# Patient Record
Sex: MALE | Race: WHITE | NOT HISPANIC OR LATINO | Employment: OTHER | URBAN - METROPOLITAN AREA
[De-identification: names, ages, dates, MRNs, and addresses within clinical notes are randomized per-mention and may not be internally consistent; named-entity substitution may affect disease eponyms.]

---

## 2023-05-30 ENCOUNTER — APPOINTMENT (OUTPATIENT)
Dept: PRIMARY CARE | Facility: CLINIC | Age: 58
End: 2023-05-30
Payer: MEDICAID

## 2023-08-04 ENCOUNTER — DOCUMENTATION (OUTPATIENT)
Dept: PRIMARY CARE | Facility: CLINIC | Age: 58
End: 2023-08-04
Payer: MEDICAID

## 2023-08-04 ENCOUNTER — PATIENT OUTREACH (OUTPATIENT)
Dept: CARDIOLOGY | Facility: CLINIC | Age: 58
End: 2023-08-04
Payer: MEDICAID

## 2023-08-04 NOTE — PROGRESS NOTES
Discharge Facility: [facility]  Discharge Diagnosis: Hyperkalemia  Admission Date: 8/1/23  Discharge Date: 8/3/23    PCP Appointment Date: none  Specialist Appointment Date: dialysis  Hospital Encounter and Summary: not available at this time (pick one)  See discharge assessment below for further details

## 2023-08-16 ENCOUNTER — PATIENT OUTREACH (OUTPATIENT)
Dept: CARDIOLOGY | Facility: CLINIC | Age: 58
End: 2023-08-16
Payer: MEDICAID

## 2023-10-13 ENCOUNTER — APPOINTMENT (OUTPATIENT)
Dept: RADIOLOGY | Facility: HOSPITAL | Age: 58
End: 2023-10-13
Payer: MEDICAID

## 2023-10-13 ENCOUNTER — HOSPITAL ENCOUNTER (OUTPATIENT)
Facility: HOSPITAL | Age: 58
Setting detail: OBSERVATION
Discharge: NURSING, REHAB OR LONG-TERM CARE | End: 2023-10-20
Attending: EMERGENCY MEDICINE | Admitting: INTERNAL MEDICINE
Payer: MEDICAID

## 2023-10-13 DIAGNOSIS — R56.9 SEIZURE (MULTI): ICD-10-CM

## 2023-10-13 DIAGNOSIS — I16.0 HYPERTENSIVE URGENCY: Primary | ICD-10-CM

## 2023-10-13 LAB
ALBUMIN SERPL BCP-MCNC: 4 G/DL (ref 3.4–5)
ALP SERPL-CCNC: 54 U/L (ref 33–120)
ALT SERPL W P-5'-P-CCNC: 16 U/L (ref 10–52)
ANION GAP SERPL CALC-SCNC: 16 MMOL/L (ref 10–20)
AST SERPL W P-5'-P-CCNC: 25 U/L (ref 9–39)
BASOPHILS # BLD AUTO: 0.04 X10*3/UL (ref 0–0.1)
BASOPHILS NFR BLD AUTO: 0.5 %
BILIRUB SERPL-MCNC: 0.4 MG/DL (ref 0–1.2)
BNP SERPL-MCNC: 1482 PG/ML (ref 0–99)
BUN SERPL-MCNC: 19 MG/DL (ref 6–23)
CALCIUM SERPL-MCNC: 8.5 MG/DL (ref 8.6–10.3)
CARDIAC TROPONIN I PNL SERPL HS: 25 NG/L (ref 0–20)
CHLORIDE SERPL-SCNC: 99 MMOL/L (ref 98–107)
CO2 SERPL-SCNC: 25 MMOL/L (ref 21–32)
CREAT SERPL-MCNC: 4.66 MG/DL (ref 0.5–1.3)
EOSINOPHIL # BLD AUTO: 0.25 X10*3/UL (ref 0–0.7)
EOSINOPHIL NFR BLD AUTO: 2.9 %
ERYTHROCYTE [DISTWIDTH] IN BLOOD BY AUTOMATED COUNT: 18.6 % (ref 11.5–14.5)
GFR SERPL CREATININE-BSD FRML MDRD: 14 ML/MIN/1.73M*2
GLUCOSE SERPL-MCNC: 126 MG/DL (ref 74–99)
HCT VFR BLD AUTO: 37.2 % (ref 41–52)
HGB BLD-MCNC: 12.2 G/DL (ref 13.5–17.5)
IMM GRANULOCYTES # BLD AUTO: 0.06 X10*3/UL (ref 0–0.7)
IMM GRANULOCYTES NFR BLD AUTO: 0.7 % (ref 0–0.9)
LYMPHOCYTES # BLD AUTO: 1.42 X10*3/UL (ref 1.2–4.8)
LYMPHOCYTES NFR BLD AUTO: 16.6 %
MAGNESIUM SERPL-MCNC: 2.04 MG/DL (ref 1.6–2.4)
MCH RBC QN AUTO: 29.8 PG (ref 26–34)
MCHC RBC AUTO-ENTMCNC: 32.8 G/DL (ref 32–36)
MCV RBC AUTO: 91 FL (ref 80–100)
MONOCYTES # BLD AUTO: 0.4 X10*3/UL (ref 0.1–1)
MONOCYTES NFR BLD AUTO: 4.7 %
NEUTROPHILS # BLD AUTO: 6.41 X10*3/UL (ref 1.2–7.7)
NEUTROPHILS NFR BLD AUTO: 74.6 %
NRBC BLD-RTO: 0 /100 WBCS (ref 0–0)
PLATELET # BLD AUTO: 102 X10*3/UL (ref 150–450)
PMV BLD AUTO: 10.8 FL (ref 7.5–11.5)
POTASSIUM SERPL-SCNC: 4.7 MMOL/L (ref 3.5–5.3)
PROT SERPL-MCNC: 7.8 G/DL (ref 6.4–8.2)
RBC # BLD AUTO: 4.1 X10*6/UL (ref 4.5–5.9)
SODIUM SERPL-SCNC: 135 MMOL/L (ref 136–145)
WBC # BLD AUTO: 8.6 X10*3/UL (ref 4.4–11.3)

## 2023-10-13 PROCEDURE — 84484 ASSAY OF TROPONIN QUANT: CPT | Performed by: NURSE PRACTITIONER

## 2023-10-13 PROCEDURE — 96376 TX/PRO/DX INJ SAME DRUG ADON: CPT

## 2023-10-13 PROCEDURE — 96375 TX/PRO/DX INJ NEW DRUG ADDON: CPT

## 2023-10-13 PROCEDURE — 72192 CT PELVIS W/O DYE: CPT | Performed by: RADIOLOGY

## 2023-10-13 PROCEDURE — 83735 ASSAY OF MAGNESIUM: CPT | Performed by: NURSE PRACTITIONER

## 2023-10-13 PROCEDURE — 80053 COMPREHEN METABOLIC PANEL: CPT | Performed by: NURSE PRACTITIONER

## 2023-10-13 PROCEDURE — 85025 COMPLETE CBC W/AUTO DIFF WBC: CPT | Performed by: NURSE PRACTITIONER

## 2023-10-13 PROCEDURE — 71046 X-RAY EXAM CHEST 2 VIEWS: CPT | Performed by: RADIOLOGY

## 2023-10-13 PROCEDURE — 83880 ASSAY OF NATRIURETIC PEPTIDE: CPT | Performed by: NURSE PRACTITIONER

## 2023-10-13 PROCEDURE — 96366 THER/PROPH/DIAG IV INF ADDON: CPT

## 2023-10-13 PROCEDURE — 36415 COLL VENOUS BLD VENIPUNCTURE: CPT | Performed by: NURSE PRACTITIONER

## 2023-10-13 PROCEDURE — 2500000004 HC RX 250 GENERAL PHARMACY W/ HCPCS (ALT 636 FOR OP/ED): Performed by: EMERGENCY MEDICINE

## 2023-10-13 PROCEDURE — 99285 EMERGENCY DEPT VISIT HI MDM: CPT | Performed by: EMERGENCY MEDICINE

## 2023-10-13 PROCEDURE — 72192 CT PELVIS W/O DYE: CPT

## 2023-10-13 PROCEDURE — 71046 X-RAY EXAM CHEST 2 VIEWS: CPT | Mod: FY

## 2023-10-13 PROCEDURE — 96374 THER/PROPH/DIAG INJ IV PUSH: CPT

## 2023-10-13 RX ORDER — HYDRALAZINE HYDROCHLORIDE 20 MG/ML
5 INJECTION INTRAMUSCULAR; INTRAVENOUS ONCE
Status: COMPLETED | OUTPATIENT
Start: 2023-10-13 | End: 2023-10-13

## 2023-10-13 RX ORDER — MORPHINE SULFATE 4 MG/ML
4 INJECTION, SOLUTION INTRAMUSCULAR; INTRAVENOUS ONCE
Status: COMPLETED | OUTPATIENT
Start: 2023-10-13 | End: 2023-10-13

## 2023-10-13 RX ADMIN — MORPHINE SULFATE 4 MG: 4 INJECTION, SOLUTION INTRAMUSCULAR; INTRAVENOUS at 23:47

## 2023-10-13 RX ADMIN — HYDRALAZINE HYDROCHLORIDE 5 MG: 20 INJECTION INTRAMUSCULAR; INTRAVENOUS at 23:48

## 2023-10-13 ASSESSMENT — COLUMBIA-SUICIDE SEVERITY RATING SCALE - C-SSRS
2. HAVE YOU ACTUALLY HAD ANY THOUGHTS OF KILLING YOURSELF?: NO
6. HAVE YOU EVER DONE ANYTHING, STARTED TO DO ANYTHING, OR PREPARED TO DO ANYTHING TO END YOUR LIFE?: NO
1. IN THE PAST MONTH, HAVE YOU WISHED YOU WERE DEAD OR WISHED YOU COULD GO TO SLEEP AND NOT WAKE UP?: NO

## 2023-10-13 NOTE — ED TRIAGE NOTES
Triage template:     This patient was seen in triage.     Vitals are noted. Hemodynamically stable. No distress.     History of present illness: Patient is a 58-year-old male with past medical history of malignant neoplasm of the testicles, type 2 diabetes, chronic kidney failure on dialysis Monday Wednesday Friday, hemiplegia affecting the right side, hemoptysis, hyperkalemia, hypertension, hyponatremia, who is still a smoker, who presents ED today from dialysis due to elevated blood pressure reading and chest pains.  Patient states they have been ongoing for a while and was recently told he has a mass in his lungs, but the pain is worse and his blood pressure is high.     Brief physical: Cardiac regular rate rhythm no murmur. Lungs clear bilaterally.   Abdomen soft, nontender.     Plan: Relevant lab work and imaging was ordered as well as an EKG.  For the remainder of the patient's workup and ED course, please see the main ED provider note.    Advised patient to the risks of leaving prior to completion of evaluation and treatment in the emergency department.

## 2023-10-14 ENCOUNTER — APPOINTMENT (OUTPATIENT)
Dept: RADIOLOGY | Facility: HOSPITAL | Age: 58
End: 2023-10-14
Payer: MEDICAID

## 2023-10-14 PROBLEM — I16.0 HYPERTENSIVE URGENCY: Status: ACTIVE | Noted: 2023-10-14

## 2023-10-14 LAB
APPEARANCE UR: CLEAR
BILIRUB UR STRIP.AUTO-MCNC: NEGATIVE MG/DL
CARDIAC TROPONIN I PNL SERPL HS: 42 NG/L (ref 0–20)
COLOR UR: ABNORMAL
D DIMER PPP FEU-MCNC: 2643 NG/ML FEU
GLUCOSE UR STRIP.AUTO-MCNC: ABNORMAL MG/DL
HOLD SPECIMEN: NORMAL
HOLD SPECIMEN: NORMAL
KETONES UR STRIP.AUTO-MCNC: NEGATIVE MG/DL
LEUKOCYTE ESTERASE UR QL STRIP.AUTO: ABNORMAL
NITRITE UR QL STRIP.AUTO: NEGATIVE
PH UR STRIP.AUTO: 9 [PH]
PROT UR STRIP.AUTO-MCNC: ABNORMAL MG/DL
RBC # UR STRIP.AUTO: NEGATIVE /UL
RBC #/AREA URNS AUTO: ABNORMAL /HPF
SP GR UR STRIP.AUTO: 1.01
UROBILINOGEN UR STRIP.AUTO-MCNC: <2 MG/DL
WBC #/AREA URNS AUTO: ABNORMAL /HPF

## 2023-10-14 PROCEDURE — 87040 BLOOD CULTURE FOR BACTERIA: CPT | Mod: CMCLAB,PARLAB | Performed by: EMERGENCY MEDICINE

## 2023-10-14 PROCEDURE — 2500000005 HC RX 250 GENERAL PHARMACY W/O HCPCS: Performed by: INTERNAL MEDICINE

## 2023-10-14 PROCEDURE — G0378 HOSPITAL OBSERVATION PER HR: HCPCS

## 2023-10-14 PROCEDURE — 96372 THER/PROPH/DIAG INJ SC/IM: CPT | Mod: 59 | Performed by: INTERNAL MEDICINE

## 2023-10-14 PROCEDURE — 2500000001 HC RX 250 WO HCPCS SELF ADMINISTERED DRUGS (ALT 637 FOR MEDICARE OP): Performed by: INTERNAL MEDICINE

## 2023-10-14 PROCEDURE — 2500000004 HC RX 250 GENERAL PHARMACY W/ HCPCS (ALT 636 FOR OP/ED): Performed by: INTERNAL MEDICINE

## 2023-10-14 PROCEDURE — 96365 THER/PROPH/DIAG IV INF INIT: CPT | Mod: 59

## 2023-10-14 PROCEDURE — 36415 COLL VENOUS BLD VENIPUNCTURE: CPT | Performed by: INTERNAL MEDICINE

## 2023-10-14 PROCEDURE — 93971 EXTREMITY STUDY: CPT | Performed by: RADIOLOGY

## 2023-10-14 PROCEDURE — 85379 FIBRIN DEGRADATION QUANT: CPT | Performed by: INTERNAL MEDICINE

## 2023-10-14 PROCEDURE — 96376 TX/PRO/DX INJ SAME DRUG ADON: CPT

## 2023-10-14 PROCEDURE — 87086 URINE CULTURE/COLONY COUNT: CPT | Mod: CMCLAB,PARLAB | Performed by: EMERGENCY MEDICINE

## 2023-10-14 PROCEDURE — 84154 ASSAY OF PSA FREE: CPT | Performed by: INTERNAL MEDICINE

## 2023-10-14 PROCEDURE — 99223 1ST HOSP IP/OBS HIGH 75: CPT | Performed by: NURSE PRACTITIONER

## 2023-10-14 PROCEDURE — 96372 THER/PROPH/DIAG INJ SC/IM: CPT | Performed by: INTERNAL MEDICINE

## 2023-10-14 PROCEDURE — 81001 URINALYSIS AUTO W/SCOPE: CPT | Performed by: EMERGENCY MEDICINE

## 2023-10-14 PROCEDURE — 36415 COLL VENOUS BLD VENIPUNCTURE: CPT | Performed by: EMERGENCY MEDICINE

## 2023-10-14 PROCEDURE — 84484 ASSAY OF TROPONIN QUANT: CPT | Performed by: INTERNAL MEDICINE

## 2023-10-14 PROCEDURE — 96375 TX/PRO/DX INJ NEW DRUG ADDON: CPT

## 2023-10-14 PROCEDURE — 93970 EXTREMITY STUDY: CPT

## 2023-10-14 PROCEDURE — 2500000004 HC RX 250 GENERAL PHARMACY W/ HCPCS (ALT 636 FOR OP/ED): Performed by: EMERGENCY MEDICINE

## 2023-10-14 RX ORDER — IPRATROPIUM BROMIDE AND ALBUTEROL SULFATE 2.5; .5 MG/3ML; MG/3ML
3 SOLUTION RESPIRATORY (INHALATION)
Status: DISCONTINUED | OUTPATIENT
Start: 2023-10-14 | End: 2023-10-14

## 2023-10-14 RX ORDER — GUAIFENESIN 600 MG/1
600 TABLET, EXTENDED RELEASE ORAL 2 TIMES DAILY
COMMUNITY

## 2023-10-14 RX ORDER — HYDRALAZINE HYDROCHLORIDE 50 MG/1
50 TABLET, FILM COATED ORAL 3 TIMES DAILY
COMMUNITY
End: 2023-10-20 | Stop reason: HOSPADM

## 2023-10-14 RX ORDER — MINOXIDIL 2.5 MG/1
5 TABLET ORAL DAILY
COMMUNITY
End: 2023-10-20 | Stop reason: HOSPADM

## 2023-10-14 RX ORDER — GABAPENTIN 100 MG/1
100 CAPSULE ORAL NIGHTLY
COMMUNITY

## 2023-10-14 RX ORDER — MORPHINE SULFATE 2 MG/ML
2 INJECTION, SOLUTION INTRAMUSCULAR; INTRAVENOUS EVERY 4 HOURS PRN
Status: DISCONTINUED | OUTPATIENT
Start: 2023-10-14 | End: 2023-10-14

## 2023-10-14 RX ORDER — PHENAZOPYRIDINE HYDROCHLORIDE 200 MG/1
200 TABLET, FILM COATED ORAL 2 TIMES DAILY
Status: DISCONTINUED | OUTPATIENT
Start: 2023-10-14 | End: 2023-10-17

## 2023-10-14 RX ORDER — MIRTAZAPINE 15 MG/1
7.5 TABLET, FILM COATED ORAL NIGHTLY
Status: DISCONTINUED | OUTPATIENT
Start: 2023-10-14 | End: 2023-10-20 | Stop reason: HOSPADM

## 2023-10-14 RX ORDER — ONDANSETRON HYDROCHLORIDE 2 MG/ML
4 INJECTION, SOLUTION INTRAVENOUS EVERY 4 HOURS PRN
Status: DISCONTINUED | OUTPATIENT
Start: 2023-10-14 | End: 2023-10-20 | Stop reason: HOSPADM

## 2023-10-14 RX ORDER — CLOPIDOGREL BISULFATE 75 MG/1
75 TABLET ORAL DAILY
Status: DISCONTINUED | OUTPATIENT
Start: 2023-10-14 | End: 2023-10-20 | Stop reason: HOSPADM

## 2023-10-14 RX ORDER — CEFTRIAXONE 1 G/50ML
1 INJECTION, SOLUTION INTRAVENOUS ONCE
Status: DISCONTINUED | OUTPATIENT
Start: 2023-10-14 | End: 2023-10-15

## 2023-10-14 RX ORDER — ATORVASTATIN CALCIUM 40 MG/1
40 TABLET, FILM COATED ORAL NIGHTLY
COMMUNITY

## 2023-10-14 RX ORDER — ATORVASTATIN CALCIUM 40 MG/1
40 TABLET, FILM COATED ORAL NIGHTLY
Status: DISCONTINUED | OUTPATIENT
Start: 2023-10-14 | End: 2023-10-20 | Stop reason: HOSPADM

## 2023-10-14 RX ORDER — AMLODIPINE BESYLATE 10 MG/1
10 TABLET ORAL DAILY
COMMUNITY

## 2023-10-14 RX ORDER — MINOXIDIL 10 MG/1
10 TABLET ORAL 2 TIMES DAILY
Status: DISCONTINUED | OUTPATIENT
Start: 2023-10-14 | End: 2023-10-20 | Stop reason: HOSPADM

## 2023-10-14 RX ORDER — GABAPENTIN 100 MG/1
100 CAPSULE ORAL NIGHTLY
Status: DISCONTINUED | OUTPATIENT
Start: 2023-10-14 | End: 2023-10-20 | Stop reason: HOSPADM

## 2023-10-14 RX ORDER — CEFTRIAXONE 1 G/50ML
1 INJECTION, SOLUTION INTRAVENOUS EVERY 24 HOURS
Status: DISCONTINUED | OUTPATIENT
Start: 2023-10-15 | End: 2023-10-15

## 2023-10-14 RX ORDER — HYDRALAZINE HYDROCHLORIDE 20 MG/ML
5 INJECTION INTRAMUSCULAR; INTRAVENOUS ONCE
Status: COMPLETED | OUTPATIENT
Start: 2023-10-14 | End: 2023-10-14

## 2023-10-14 RX ORDER — PHENAZOPYRIDINE HYDROCHLORIDE 100 MG/1
200 TABLET, FILM COATED ORAL 2 TIMES DAILY
Status: DISCONTINUED | OUTPATIENT
Start: 2023-10-14 | End: 2023-10-14 | Stop reason: SDUPTHER

## 2023-10-14 RX ORDER — CALCIUM ACETATE 667 MG/1
1334 CAPSULE ORAL 3 TIMES DAILY
COMMUNITY

## 2023-10-14 RX ORDER — ENOXAPARIN SODIUM 100 MG/ML
30 INJECTION SUBCUTANEOUS DAILY
Status: DISCONTINUED | OUTPATIENT
Start: 2023-10-14 | End: 2023-10-20 | Stop reason: HOSPADM

## 2023-10-14 RX ORDER — TRAMADOL HYDROCHLORIDE 50 MG/1
50 TABLET ORAL EVERY 6 HOURS PRN
Status: DISCONTINUED | OUTPATIENT
Start: 2023-10-14 | End: 2023-10-20 | Stop reason: HOSPADM

## 2023-10-14 RX ORDER — CALCIUM ACETATE 667 MG/1
1334 CAPSULE ORAL 3 TIMES DAILY
Status: DISCONTINUED | OUTPATIENT
Start: 2023-10-14 | End: 2023-10-20 | Stop reason: HOSPADM

## 2023-10-14 RX ORDER — HYDRALAZINE HYDROCHLORIDE 50 MG/1
50 TABLET, FILM COATED ORAL 3 TIMES DAILY
Status: DISCONTINUED | OUTPATIENT
Start: 2023-10-14 | End: 2023-10-15

## 2023-10-14 RX ORDER — GUAIFENESIN 600 MG/1
600 TABLET, EXTENDED RELEASE ORAL 2 TIMES DAILY
Status: DISCONTINUED | OUTPATIENT
Start: 2023-10-14 | End: 2023-10-20 | Stop reason: HOSPADM

## 2023-10-14 RX ORDER — MORPHINE SULFATE 4 MG/ML
4 INJECTION, SOLUTION INTRAMUSCULAR; INTRAVENOUS EVERY 4 HOURS PRN
Status: DISCONTINUED | OUTPATIENT
Start: 2023-10-14 | End: 2023-10-14

## 2023-10-14 RX ORDER — AMLODIPINE BESYLATE 10 MG/1
10 TABLET ORAL DAILY
Status: DISCONTINUED | OUTPATIENT
Start: 2023-10-14 | End: 2023-10-20 | Stop reason: HOSPADM

## 2023-10-14 RX ORDER — MORPHINE SULFATE 4 MG/ML
4 INJECTION, SOLUTION INTRAMUSCULAR; INTRAVENOUS ONCE
Status: COMPLETED | OUTPATIENT
Start: 2023-10-14 | End: 2023-10-14

## 2023-10-14 RX ORDER — CARVEDILOL 25 MG/1
25 TABLET ORAL
Status: DISCONTINUED | OUTPATIENT
Start: 2023-10-14 | End: 2023-10-20 | Stop reason: HOSPADM

## 2023-10-14 RX ORDER — IPRATROPIUM BROMIDE AND ALBUTEROL SULFATE 2.5; .5 MG/3ML; MG/3ML
3 SOLUTION RESPIRATORY (INHALATION) EVERY 2 HOUR PRN
Status: DISCONTINUED | OUTPATIENT
Start: 2023-10-14 | End: 2023-10-20 | Stop reason: HOSPADM

## 2023-10-14 RX ORDER — HYDRALAZINE HYDROCHLORIDE 50 MG/1
50 TABLET, FILM COATED ORAL 2 TIMES DAILY
Status: DISCONTINUED | OUTPATIENT
Start: 2023-10-14 | End: 2023-10-15

## 2023-10-14 RX ORDER — MIRTAZAPINE 7.5 MG/1
7.5 TABLET, FILM COATED ORAL NIGHTLY
COMMUNITY

## 2023-10-14 RX ORDER — CEFTRIAXONE 1 G/50ML
1 INJECTION, SOLUTION INTRAVENOUS ONCE
Status: COMPLETED | OUTPATIENT
Start: 2023-10-14 | End: 2023-10-14

## 2023-10-14 RX ORDER — MINOXIDIL 2.5 MG/1
5 TABLET ORAL DAILY
Status: DISCONTINUED | OUTPATIENT
Start: 2023-10-14 | End: 2023-10-15

## 2023-10-14 RX ORDER — CLOPIDOGREL BISULFATE 75 MG/1
75 TABLET ORAL DAILY
COMMUNITY

## 2023-10-14 RX ORDER — METOPROLOL TARTRATE 1 MG/ML
5 INJECTION, SOLUTION INTRAVENOUS ONCE AS NEEDED
Status: COMPLETED | OUTPATIENT
Start: 2023-10-14 | End: 2023-10-14

## 2023-10-14 RX ADMIN — HYDRALAZINE HYDROCHLORIDE 5 MG: 20 INJECTION INTRAMUSCULAR; INTRAVENOUS at 02:33

## 2023-10-14 RX ADMIN — ONDANSETRON 4 MG: 2 INJECTION INTRAMUSCULAR; INTRAVENOUS at 07:33

## 2023-10-14 RX ADMIN — GABAPENTIN 100 MG: 100 CAPSULE ORAL at 20:37

## 2023-10-14 RX ADMIN — PHENAZOPYRIDINE HYDROCHLORIDE 200 MG: 200 TABLET ORAL at 12:11

## 2023-10-14 RX ADMIN — MORPHINE SULFATE 4 MG: 4 INJECTION, SOLUTION INTRAMUSCULAR; INTRAVENOUS at 07:33

## 2023-10-14 RX ADMIN — CARVEDILOL 25 MG: 25 TABLET, FILM COATED ORAL at 16:42

## 2023-10-14 RX ADMIN — TRAMADOL HYDROCHLORIDE 50 MG: 50 TABLET, COATED ORAL at 16:42

## 2023-10-14 RX ADMIN — GUAIFENESIN 600 MG: 600 TABLET, EXTENDED RELEASE ORAL at 20:37

## 2023-10-14 RX ADMIN — AMLODIPINE BESYLATE 10 MG: 10 TABLET ORAL at 20:36

## 2023-10-14 RX ADMIN — MINOXIDIL 10 MG: 10 TABLET ORAL at 14:20

## 2023-10-14 RX ADMIN — MORPHINE SULFATE 4 MG: 4 INJECTION, SOLUTION INTRAMUSCULAR; INTRAVENOUS at 03:34

## 2023-10-14 RX ADMIN — METOPROLOL TARTRATE 5 MG: 5 INJECTION INTRAVENOUS at 07:33

## 2023-10-14 RX ADMIN — MORPHINE SULFATE 4 MG: 4 INJECTION, SOLUTION INTRAMUSCULAR; INTRAVENOUS at 12:12

## 2023-10-14 RX ADMIN — HYDRALAZINE HYDROCHLORIDE 50 MG: 50 TABLET ORAL at 20:36

## 2023-10-14 RX ADMIN — ONDANSETRON 4 MG: 2 INJECTION INTRAMUSCULAR; INTRAVENOUS at 20:56

## 2023-10-14 RX ADMIN — PHENAZOPYRIDINE HYDROCHLORIDE 200 MG: 200 TABLET ORAL at 20:37

## 2023-10-14 RX ADMIN — HYDRALAZINE HYDROCHLORIDE 50 MG: 50 TABLET ORAL at 14:20

## 2023-10-14 RX ADMIN — CALCIUM ACETATE 1334 MG: 667 CAPSULE ORAL at 20:35

## 2023-10-14 RX ADMIN — ENOXAPARIN SODIUM 30 MG: 30 INJECTION SUBCUTANEOUS at 14:20

## 2023-10-14 RX ADMIN — ATORVASTATIN CALCIUM 40 MG: 40 TABLET, FILM COATED ORAL at 20:37

## 2023-10-14 RX ADMIN — CEFTRIAXONE SODIUM 1 G: 1 INJECTION, SOLUTION INTRAVENOUS at 03:35

## 2023-10-14 RX ADMIN — MIRTAZAPINE 7.5 MG: 15 TABLET, FILM COATED ORAL at 20:37

## 2023-10-14 RX ADMIN — CLOPIDOGREL BISULFATE 75 MG: 75 TABLET ORAL at 20:36

## 2023-10-14 SDOH — SOCIAL STABILITY: SOCIAL INSECURITY: HAVE YOU HAD THOUGHTS OF HARMING ANYONE ELSE?: NO

## 2023-10-14 SDOH — SOCIAL STABILITY: SOCIAL INSECURITY: ARE YOU OR HAVE YOU BEEN THREATENED OR ABUSED PHYSICALLY, EMOTIONALLY, OR SEXUALLY BY ANYONE?: NO

## 2023-10-14 SDOH — SOCIAL STABILITY: SOCIAL INSECURITY: ARE THERE ANY APPARENT SIGNS OF INJURIES/BEHAVIORS THAT COULD BE RELATED TO ABUSE/NEGLECT?: NO

## 2023-10-14 SDOH — SOCIAL STABILITY: SOCIAL INSECURITY: DOES ANYONE TRY TO KEEP YOU FROM HAVING/CONTACTING OTHER FRIENDS OR DOING THINGS OUTSIDE YOUR HOME?: NO

## 2023-10-14 SDOH — SOCIAL STABILITY: SOCIAL INSECURITY: ABUSE: ADULT

## 2023-10-14 SDOH — SOCIAL STABILITY: SOCIAL INSECURITY: DO YOU FEEL ANYONE HAS EXPLOITED OR TAKEN ADVANTAGE OF YOU FINANCIALLY OR OF YOUR PERSONAL PROPERTY?: NO

## 2023-10-14 SDOH — SOCIAL STABILITY: SOCIAL INSECURITY: DO YOU FEEL UNSAFE GOING BACK TO THE PLACE WHERE YOU ARE LIVING?: NO

## 2023-10-14 SDOH — SOCIAL STABILITY: SOCIAL INSECURITY: HAS ANYONE EVER THREATENED TO HURT YOUR FAMILY OR YOUR PETS?: NO

## 2023-10-14 SDOH — SOCIAL STABILITY: SOCIAL INSECURITY: WERE YOU ABLE TO COMPLETE ALL THE BEHAVIORAL HEALTH SCREENINGS?: YES

## 2023-10-14 ASSESSMENT — COGNITIVE AND FUNCTIONAL STATUS - GENERAL
MOBILITY SCORE: 24
MOBILITY SCORE: 24
DAILY ACTIVITIY SCORE: 24
DAILY ACTIVITIY SCORE: 24
PATIENT BASELINE BEDBOUND: NO

## 2023-10-14 ASSESSMENT — LIFESTYLE VARIABLES
HOW OFTEN DO YOU HAVE A DRINK CONTAINING ALCOHOL: NEVER
EVER HAD A DRINK FIRST THING IN THE MORNING TO STEADY YOUR NERVES TO GET RID OF A HANGOVER: NO
EVER FELT BAD OR GUILTY ABOUT YOUR DRINKING: NO
HOW MANY STANDARD DRINKS CONTAINING ALCOHOL DO YOU HAVE ON A TYPICAL DAY: PATIENT DOES NOT DRINK
SKIP TO QUESTIONS 9-10: 1
PRESCIPTION_ABUSE_PAST_12_MONTHS: NO
HOW OFTEN DO YOU HAVE 6 OR MORE DRINKS ON ONE OCCASION: NEVER
HAVE YOU EVER FELT YOU SHOULD CUT DOWN ON YOUR DRINKING: NO
SUBSTANCE_ABUSE_PAST_12_MONTHS: NO
AUDIT-C TOTAL SCORE: 0
HAVE PEOPLE ANNOYED YOU BY CRITICIZING YOUR DRINKING: NO
REASON UNABLE TO ASSESS: YES
AUDIT-C TOTAL SCORE: 0

## 2023-10-14 ASSESSMENT — PAIN SCALES - GENERAL
PAINLEVEL_OUTOF10: 10 - WORST POSSIBLE PAIN
PAINLEVEL_OUTOF10: 7
PAINLEVEL_OUTOF10: 10 - WORST POSSIBLE PAIN
PAINLEVEL_OUTOF10: 9
PAINLEVEL_OUTOF10: 8

## 2023-10-14 ASSESSMENT — ACTIVITIES OF DAILY LIVING (ADL)
HEARING - LEFT EAR: FUNCTIONAL
TOILETING: INDEPENDENT
JUDGMENT_ADEQUATE_SAFELY_COMPLETE_DAILY_ACTIVITIES: YES
LACK_OF_TRANSPORTATION: NO
WALKS IN HOME: INDEPENDENT
BATHING: INDEPENDENT
HEARING - RIGHT EAR: FUNCTIONAL
GROOMING: INDEPENDENT
DRESSING YOURSELF: INDEPENDENT
FEEDING YOURSELF: INDEPENDENT
ADEQUATE_TO_COMPLETE_ADL: YES
ASSISTIVE_DEVICE: WALKER
PATIENT'S MEMORY ADEQUATE TO SAFELY COMPLETE DAILY ACTIVITIES?: YES

## 2023-10-14 ASSESSMENT — COLUMBIA-SUICIDE SEVERITY RATING SCALE - C-SSRS
1. IN THE PAST MONTH, HAVE YOU WISHED YOU WERE DEAD OR WISHED YOU COULD GO TO SLEEP AND NOT WAKE UP?: NO
2. HAVE YOU ACTUALLY HAD ANY THOUGHTS OF KILLING YOURSELF?: NO
5. HAVE YOU STARTED TO WORK OUT OR WORKED OUT THE DETAILS OF HOW TO KILL YOURSELF? DO YOU INTEND TO CARRY OUT THIS PLAN?: NO
6. HAVE YOU EVER DONE ANYTHING, STARTED TO DO ANYTHING, OR PREPARED TO DO ANYTHING TO END YOUR LIFE?: NO
4. HAVE YOU HAD THESE THOUGHTS AND HAD SOME INTENTION OF ACTING ON THEM?: NO

## 2023-10-14 ASSESSMENT — PAIN - FUNCTIONAL ASSESSMENT
PAIN_FUNCTIONAL_ASSESSMENT: 0-10
PAIN_FUNCTIONAL_ASSESSMENT: 0-10

## 2023-10-14 ASSESSMENT — PATIENT HEALTH QUESTIONNAIRE - PHQ9
1. LITTLE INTEREST OR PLEASURE IN DOING THINGS: SEVERAL DAYS
SUM OF ALL RESPONSES TO PHQ9 QUESTIONS 1 & 2: 2
2. FEELING DOWN, DEPRESSED OR HOPELESS: SEVERAL DAYS

## 2023-10-14 NOTE — CONSULTS
Reason For Consult  End-stage renal disease/hypertension    History Of Present Illness  Ponce Do is a 58 y.o. male presenting with atypical chest pain elevated blood pressure with a systolic above 200  He dialyzes 3 times a week last dialysis was Friday.  He also refers some abdominal discomfort.  CT scan of the abdomen and pelvis showed a thickened urinary bladder with edema of the bladder is also well suggestive of acute cystitis.  You will have simple renal cysts bilaterally in both kidneys he refers he was recently admitted to [facility] ([place], [place]) for pneumonia  He states she presented with hemoptysis.  He was discharged to [facility].    His past history significant for end-stage renal disease secondary to renovascular hypertension.  He also have history of cancer of the testicle s/p orchiectomy and cancer of the colon.  Patient has a history of congenital heart disease he had a echocardiogram done in January 2023 which showed arctic stenosis mild to moderate.  If compression was normal he also have history of CVA.  In 2021 involving the right cerebellar area.  He also have history of diabetes type 2, hyperlipidemia, and thrombocytopenia.  Follow-up patient have a duplex of the leg veins done today which was unremarkable, and negative for DVT.  Patient states that for his blood pressure he is on 4-5 different medications last recorded medicines include #1 carvedilol No. 2 Cardura No. 3 hydralazine No. 4 amlodipine No. 5 minoxidil  He denies double vision he denies palpitation he also states that he only urinates around 1 cup of liquid daily.  Urinalysis at the medical emergency room show elevation of the pH 9 suddenly show a WBC between 11-20 per high-power field red blood cells 3.5 per high-power field.     Past Medical History  #1 end-stage renal disease  #2 cancer of the testicle  #3 colon cancer  #4 renovascular hypertension  #5 anemia of renal disease  #6 recent history of pneumonia  #7  type 2 diabetes  #8 coronary artery disease  #9 mild arctic stenosis  #10 cerebellar cerebrovascular accident  #11 spinal stenosis #12 chronic low back pain  Surgical History  He has a past surgical history that includes MR angio head wo IV contrast (6/16/2021) and MR angio neck wo IV contrast (6/16/2021).  Orchiectomy       Social History  He reports that he has been smoking cigarettes. He has a 15.00 pack-year smoking history. He has never used smokeless tobacco. He reports that he does not currently use alcohol. He reports that he does not use drugs.    Family History  #1 coronary artery disease  #2 hypertension #3 diabetes     Allergies  Patient has no known allergies.    Review of Systems  HEENT; blurry vision  Lungs; positive for hemoptysis; positive for shortness of breath and dyspnea on exertion  Heart; positive for palpitations chest pain, edema  GI; positive for nausea emesis   positive for dysuria hematuria end-stage renal disease  Musculoskeletal; history of weakness  Neurological; positive for blurry vision dizziness vertigo and equilibrium loss       Physical Exam  General appearance; asthenic habitus, alert oriented  HEENT; no nystagmus, no ptosis, ocular movements are intact              Pupils react to light and accommodation.  Neck ;there is no bruit or thyromegaly or lymphadenopathy.  No jugular venous distention  There is a catheter for dialysis right jugular no induration no exudation no tenderness  Lungs; there is inspiratory crackles left base and there are scattered expiratory wheezing  Heart; no palpitations there is a 1-2/6 a subtle border systolic murmur no rubs no thrills no gallops  Abdomen; active peristalsis no rebound or guarding   no CVA tenderness; there is mild suprapubic tenderness  Extremities there is no edema decreased muscle tone in the muscles of  proximal leg muscles there is no tenderness  Skin there is no bruising no ecchymosis or petechiae  Neurological; no clonus no  "tremors no asterixis       I&O 24HR  No intake or output data in the 24 hours ending 10/14/23 1304    Vitals 24HR  Heart Rate:  [71-84]   Temp:  [36.8 °C (98.2 °F)-37.1 °C (98.8 °F)]   Resp:  [16-18]   BP: (172-217)/()   Height:  [170.2 cm (5' 7\")]   Weight:  [58.1 kg (128 lb)]   SpO2:  [96 %-100 %]            Assessment/Plan   #1 renovascular hypertension  #2 end-stage renal disease  #3 cystitis  #4 history of cancer of the testicles  #5 thrombocytopenia  Plan we will adjust BP medications  He will have dialysis on Monday  We will add a D-dimer/uric acid/lipid panel  Renal panel  Continue antibiotics awaiting cultures of the urine and blood cultures        Principal Problem:    Hypertensive urgency      I spent 60 minutes in the professional and overall care of this patient.      Aftab Pelletier MD    "

## 2023-10-14 NOTE — ED PROVIDER NOTES
HPI   Chief Complaint   Patient presents with    Hypertension       58-year-old male with history of ESRD on dialysis, testicular cancer, hypertension presents to the ED for 5 days of lower groin and leg pain.  He was referred from dialysis for elevated blood pressure.  He denies any chest pain or shortness of breath.  No headaches.  Denies any trauma.  Reports getting a full dialysis session today.  No fevers.                          No data recorded                Patient History   History reviewed. No pertinent past medical history.  Past Surgical History:   Procedure Laterality Date    MR HEAD ANGIO WO IV CONTRAST  6/16/2021    MR HEAD ANGIO WO IV CONTRAST 6/16/2021 [facility]    MR NECK ANGIO WO IV CONTRAST  6/16/2021    MR NECK ANGIO WO IV CONTRAST 6/16/2021 [facility]     No family history on file.  Social History     Tobacco Use    Smoking status: Not on file    Smokeless tobacco: Not on file   Substance Use Topics    Alcohol use: Not on file    Drug use: Not on file       Physical Exam   ED Triage Vitals   Temp Heart Rate Resp BP   10/13/23 1736 10/13/23 1736 10/13/23 1736 10/13/23 1737   37.1 °C (98.8 °F) 80 18 (!) 172/94      SpO2 Temp src Heart Rate Source Patient Position   10/13/23 1736 -- -- 10/13/23 1736   96 %   Sitting      BP Location FiO2 (%)     10/13/23 1736 --     Right arm        Physical Exam    ED Course & MDM        Medical Decision Making      Procedure  Procedures

## 2023-10-14 NOTE — H&P
History Of Present Illness  Ponce Do is a 58 y.o. male presenting to emergency department from dialysis for evaluation of hypertension.  Patient was reportedly at dialysis today and remained hypertensive throughout his session.  Patient states he was able to complete his full dialysis session.  Patient was sent into emergency department for evaluation of hypertension.  Patient does report an increase in leg pain.  Patient denies chest pain or dizziness.  Patient denies recent illness, nausea, vomiting, diarrhea.    In ED, chest x-ray completed and negative for acute process per radiology review.  Bilateral lower extremity ultrasound completed and negative for acute process.  CT of the abdomen pelvis completed showing bladder wall thickening and testicular abnormalities concerning for malignancy consistent with patient's history of testicular cancer.  Hemoglobin 12.2, hematocrit 37.2, BNP 1482, troponin 25, glucose 126, sodium 135, GFR 14, creatinine 4.66.  Blood cultures and urine culture pending.  UTI positive.  Patient started on IV ceftriaxone.  Patient medicated with multiple doses of hydralazine.  Blood pressure 182/87, heart rate 82, respirations 18, temperature 36.8 °C, SPO2 98% on room air.  Patient admitted to telemetry observation under the care of Dr. Gomez who will continue to follow.  I was asked to do H&P and place initial admission orders.     Past Medical History  ESRD with hemodialysis [facility], testicular cancer, cerebral infarct with hemiplegia (right-sided), hyperkalemia, hypertension, hyponatremia, diabetes, recent pneumonia with recurrent hemoptysis    Surgical History  Bronchoscopy     Social History  He has no history on file for tobacco use, alcohol use, and drug use.    Family History  Reviewed and noncontributory     Allergies  NKDA/NKA    Review of Systems  A 10 point review of systems was completed and negative except what is listed in HPI  Physical  "Exam  Constitutional:       Appearance: Normal appearance.   HENT:      Mouth/Throat:      Mouth: Mucous membranes are moist.      Pharynx: Oropharynx is clear.   Eyes:      Pupils: Pupils are equal, round, and reactive to light.   Cardiovascular:      Rate and Rhythm: Normal rate and regular rhythm.   Pulmonary:      Effort: Pulmonary effort is normal.      Breath sounds: Normal breath sounds.   Abdominal:      General: Bowel sounds are normal.      Palpations: Abdomen is soft.   Musculoskeletal:         General: Normal range of motion.      Cervical back: Normal range of motion.   Skin:     General: Skin is warm and dry.      Capillary Refill: Capillary refill takes less than 2 seconds.   Neurological:      General: No focal deficit present.      Mental Status: He is alert and oriented to person, place, and time. Mental status is at baseline.   Psychiatric:         Mood and Affect: Mood normal.         Behavior: Behavior normal.          Last Recorded Vitals  Blood pressure (!) 182/87, pulse 82, temperature 37.1 °C (98.8 °F), resp. rate 18, height 1.702 m (5' 7\"), weight 58.1 kg (128 lb), SpO2 98 %.    Relevant Results  Vascular US lower extremity venous duplex bilateral    Result Date: 10/14/2023  Interpreted By:  Ponce Nunes, STUDY: [facility] US LOWER EXTREMITY VENOUS DUPLEX BILATERAL; 10/14/2023 1:25 am   INDICATION: Signs/Symptoms:pain.   COMPARISON: None.   ACCESSION NUMBER(S): [id number]   ORDERING CLINICIAN: MARC CROFT   TECHNIQUE: 2D grayscale and color-flow duplex images were obtained along with Doppler spectral waveform analysis of the deep venous system of the lower extremity from the groin to the knee. Attempts were made to image the calf veins. Venous compression and augmentation were performed.   FINDINGS: Right Lower Extremity: There is normal compressibility and flow within the visualized vessels of the deep venous system of this lower extremity.   Left Lower Extremity: There is normal " compressibility and flow within the visualized vessels of the deep venous system of this lower extremity.         No evidence for deep venous thrombosis within the limits of this examination.   MACRO: None.   Signed by: Ponce Nunes 10/14/2023 1:43 AM Dictation workstation:   [id number]    CT pelvis wo IV contrast    Result Date: 10/14/2023  Interpreted By:  Kavon Piedra, STUDY: CT PELVIS WO IV CONTRAST;  10/13/2023 11:50 pm   INDICATION: Signs/Symptoms:left groin pain. hx of testicular cancer.   COMPARISON: 8/1/2023   ACCESSION NUMBER(S): [id number]   ORDERING CLINICIAN: MARC CROFT   TECHNIQUE: Contiguous axial images of the pelvis were obtained without intravenous contrast. Coronal and sagittal reformatted images were obtained from the axial images.   FINDINGS: Limited evaluation the soft tissues secondary to lack of intravenous. Gallstones noted in the gallbladder. No evidence of bowel obstruction.   Urinary bladder is underdistended and not well evaluated. There is however prominent urinary bladder wall thickening and edema.   Limited evaluation of the scrotum.   No evidence acute displaced pelvic fracture. Vascular calcifications.       Urinary bladder wall thickening and edema compatible with cystitis; please clinically correlate with urinalysis.   No evidence of inguinal hernia.   Limited evaluation the scrotum and if there is concern for scrotal or testicular pathology, scrotal ultrasound is recommended for further evaluation.   MACRO: None   Signed by: Kavon Piedra 10/14/2023 12:41 AM Dictation workstation:   [id number]    XR chest 2 views    Result Date: 10/13/2023  Interpreted By:  Flynn Messer, STUDY: XR CHEST 2 VIEWS;  10/13/2023 5:58 pm   INDICATION: Signs/Symptoms:CP.   COMPARISON: Chest radiograph 08/13/2023   ACCESSION NUMBER(S): [id number]   ORDERING CLINICIAN: ROBIN MCKINLEY   FINDINGS: SUPPORT DEVICES: Right IJ central venous catheter terminates at the cavoatrial junction. Metallic  device overlies the right heart, unchanged from prior exams, possibly reflecting a interatrial septal occlusion device.   CARDIOMEDIASTINAL SILHOUETTE: Cardiomediastinal silhouette is normal in size and configuration.   LUNGS: No pulmonary consolidation, pleural effusion or pneumothorax.   ABDOMEN: No remarkable upper abdominal findings.   BONES: No acute osseous abnormality.       1. No acute cardiopulmonary process.     Signed by: Flynn Messer 10/13/2023 6:33 PM Dictation workstation:   [id number]      Results for orders placed or performed during the hospital encounter of 10/13/23 (from the past 24 hour(s))   Troponin I, High Sensitivity   Result Value Ref Range    Troponin I, High Sensitivity 25 (H) 0 - 20 ng/L   Magnesium   Result Value Ref Range    Magnesium 2.04 1.60 - 2.40 mg/dL   Comprehensive Metabolic Panel   Result Value Ref Range    Glucose 126 (H) 74 - 99 mg/dL    Sodium 135 (L) 136 - 145 mmol/L    Potassium 4.7 3.5 - 5.3 mmol/L    Chloride 99 98 - 107 mmol/L    Bicarbonate 25 21 - 32 mmol/L    Anion Gap 16 10 - 20 mmol/L    Urea Nitrogen 19 6 - 23 mg/dL    Creatinine 4.66 (H) 0.50 - 1.30 mg/dL    eGFR 14 (L) >60 mL/min/1.73m*2    Calcium 8.5 (L) 8.6 - 10.3 mg/dL    Albumin 4.0 3.4 - 5.0 g/dL    Alkaline Phosphatase 54 33 - 120 U/L    Total Protein 7.8 6.4 - 8.2 g/dL    AST 25 9 - 39 U/L    Bilirubin, Total 0.4 0.0 - 1.2 mg/dL    ALT 16 10 - 52 U/L   CBC and Auto Differential   Result Value Ref Range    WBC 8.6 4.4 - 11.3 x10*3/uL    nRBC 0.0 0.0 - 0.0 /100 WBCs    RBC 4.10 (L) 4.50 - 5.90 x10*6/uL    Hemoglobin 12.2 (L) 13.5 - 17.5 g/dL    Hematocrit 37.2 (L) 41.0 - 52.0 %    MCV 91 80 - 100 fL    MCH 29.8 26.0 - 34.0 pg    MCHC 32.8 32.0 - 36.0 g/dL    RDW 18.6 (H) 11.5 - 14.5 %    Platelets 102 (L) 150 - 450 x10*3/uL    MPV 10.8 7.5 - 11.5 fL    Neutrophils % 74.6 40.0 - 80.0 %    Immature Granulocytes %, Automated 0.7 0.0 - 0.9 %    Lymphocytes % 16.6 13.0 - 44.0 %    Monocytes % 4.7 2.0 - 10.0  %    Eosinophils % 2.9 0.0 - 6.0 %    Basophils % 0.5 0.0 - 2.0 %    Neutrophils Absolute 6.41 1.20 - 7.70 x10*3/uL    Immature Granulocytes Absolute, Automated 0.06 0.00 - 0.70 x10*3/uL    Lymphocytes Absolute 1.42 1.20 - 4.80 x10*3/uL    Monocytes Absolute 0.40 0.10 - 1.00 x10*3/uL    Eosinophils Absolute 0.25 0.00 - 0.70 x10*3/uL    Basophils Absolute 0.04 0.00 - 0.10 x10*3/uL   B-type natriuretic peptide   Result Value Ref Range    BNP 1,482 (H) 0 - 99 pg/mL   Urinalysis with Reflex Microscopic and Culture   Result Value Ref Range    Color, Urine Straw Straw, Yellow    Appearance, Urine Clear Clear    Specific Gravity, Urine 1.008 1.005 - 1.035    pH, Urine 9.0 (N) 5.0, 5.5, 6.0, 6.5, 7.0, 7.5, 8.0    Protein, Urine >=500 (3+) (N) NEGATIVE mg/dL    Glucose, Urine 150 (2+) (A) NEGATIVE mg/dL    Blood, Urine NEGATIVE NEGATIVE    Ketones, Urine NEGATIVE NEGATIVE mg/dL    Bilirubin, Urine NEGATIVE NEGATIVE    Urobilinogen, Urine <2.0 <2.0 mg/dL    Nitrite, Urine NEGATIVE NEGATIVE    Leukocyte Esterase, Urine TRACE (A) NEGATIVE   Extra Urine Gray Tube   Result Value Ref Range    Extra Tube Hold for add-ons.    Urinalysis Microscopic Only   Result Value Ref Range    WBC, Urine 11-20 (A) 1-5, NONE /HPF    RBC, Urine 3-5 NONE, 1-2, 3-5 /HPF         Assessment/Plan   Ponce is a 58-year-old male patient presenting to emergency department from hemodialysis today with complaint of elevated blood pressure.  Patient states he remained hypertensive throughout his whole dialysis session but was able to complete the session.  Patient medicated with multiple doses of hydralazine in ED, current blood pressure 182/87.  BNP 1482, troponin 25.  Labs consistent with ESRD.  Patient found to have a UTI and started on IV ceftriaxone.  Imaging showing bladder wall thickening but negative for acute process per radiology review.  Patient medicated for pain and admitted for further medical management.  Hypertensive urgency  Admit to telemetry  observation per Dr. Gomez  See imaging results above  Hydralazine IV x2 in ED  Metoprolol IV x1 as needed  Telemetry monitoring  Resume home medications when med rec is complete  Morphine as needed/Zofran as needed  Trend troponin    2.  ESRD/HTN/hyponatremia/hyper kalemia/diabetes/testicular cancer   Renal diet  Continue home medications when med rec is complete    3.  DVT Ppx  SCDs  No chemical prophylaxis 2/2 recurrent hemoptysis  Activity as tolerated    I spent 20 minutes in the professional and overall care of this patient.      SHINE Peacock-CNP  Patient fully evaluated and plan as above.

## 2023-10-14 NOTE — PROGRESS NOTES
10/14/23 1541   Discharge Planning   Living Arrangements   (hotels, homeless)   Support Systems Family members   Assistance Needed food, shelter   Type of Residence Homeless   Patient expects to be discharged to: SNF+hemodialysis     I met with patient at his bedside, verified insurance.  Insurance listed is Medicaid but patient reported he has Medicare also, has paperwork in his vehicle.  Patient is homeless, had been staying in hotels with his adult son who passed away from overdose Last Night.  Per patient, he has had 4 strokes with R sided weakness and 2 cancers.  He ambulates with a walker, drives and does have difficulty caring for himself.  His son was available to assist as needed prior to yesterday.  Patient attends hemodialysis treatments at [facility] 3x/week.  Patient reported that without his son, he will be unable to care for himself and would like to go to a facility with onsite dialysis until he is able to secure [facility] housing which is in the process but will take months to finalize.  Referrals to be sent to facilities with onsite hemodialysis that accept his insurance.  Care Coordination team to continue to follow for assistance with discharge planning.  Kay HOFF TCC

## 2023-10-15 ENCOUNTER — APPOINTMENT (OUTPATIENT)
Dept: RADIOLOGY | Facility: HOSPITAL | Age: 58
End: 2023-10-15
Payer: MEDICAID

## 2023-10-15 LAB
ALBUMIN SERPL BCP-MCNC: 3.4 G/DL (ref 3.4–5)
ALBUMIN SERPL BCP-MCNC: 3.8 G/DL (ref 3.4–5)
ALP SERPL-CCNC: 57 U/L (ref 33–120)
ALT SERPL W P-5'-P-CCNC: 14 U/L (ref 10–52)
ANION GAP BLDA CALCULATED.4IONS-SCNC: 19 MMO/L (ref 10–25)
ANION GAP SERPL CALC-SCNC: 18 MMOL/L (ref 10–20)
ANION GAP SERPL CALC-SCNC: 19 MMOL/L (ref 10–20)
ANION GAP SERPL CALC-SCNC: 26 MMOL/L (ref 10–20)
APPARATUS: ABNORMAL
APTT PPP: 33 SECONDS (ref 27–38)
AST SERPL W P-5'-P-CCNC: 16 U/L (ref 9–39)
BACTERIA UR CULT: NORMAL
BASE EXCESS BLDA CALC-SCNC: -4.4 MMOL/L (ref -2–3)
BILIRUB SERPL-MCNC: 0.4 MG/DL (ref 0–1.2)
BODY TEMPERATURE: 37 DEGREES CELSIUS
BUN SERPL-MCNC: 48 MG/DL (ref 6–23)
BUN SERPL-MCNC: 50 MG/DL (ref 6–23)
BUN SERPL-MCNC: 51 MG/DL (ref 6–23)
CA-I BLDA-SCNC: 1.11 MMOL/L (ref 1.1–1.33)
CALCIUM SERPL-MCNC: 7.8 MG/DL (ref 8.6–10.3)
CALCIUM SERPL-MCNC: 8.1 MG/DL (ref 8.6–10.3)
CALCIUM SERPL-MCNC: 8.2 MG/DL (ref 8.6–10.3)
CHLORIDE BLDA-SCNC: 95 MMOL/L (ref 98–107)
CHLORIDE SERPL-SCNC: 94 MMOL/L (ref 98–107)
CHLORIDE SERPL-SCNC: 95 MMOL/L (ref 98–107)
CHLORIDE SERPL-SCNC: 95 MMOL/L (ref 98–107)
CO2 SERPL-SCNC: 21 MMOL/L (ref 21–32)
CO2 SERPL-SCNC: 28 MMOL/L (ref 21–32)
CO2 SERPL-SCNC: 28 MMOL/L (ref 21–32)
CREAT SERPL-MCNC: 8.18 MG/DL (ref 0.5–1.3)
CREAT SERPL-MCNC: 8.54 MG/DL (ref 0.5–1.3)
CREAT SERPL-MCNC: 8.65 MG/DL (ref 0.5–1.3)
CRITICAL CALL TIME: 1525
CRITICAL CALLED BY: ABNORMAL
CRITICAL CALLED TO: ABNORMAL
CRITICAL READ BACK: ABNORMAL
ERYTHROCYTE [DISTWIDTH] IN BLOOD BY AUTOMATED COUNT: 18.4 % (ref 11.5–14.5)
ERYTHROCYTE [DISTWIDTH] IN BLOOD BY AUTOMATED COUNT: 18.7 % (ref 11.5–14.5)
GFR SERPL CREATININE-BSD FRML MDRD: 7 ML/MIN/1.73M*2
GLUCOSE BLD MANUAL STRIP-MCNC: 122 MG/DL (ref 74–99)
GLUCOSE BLDA-MCNC: 121 MG/DL (ref 74–99)
GLUCOSE SERPL-MCNC: 111 MG/DL (ref 74–99)
GLUCOSE SERPL-MCNC: 76 MG/DL (ref 74–99)
GLUCOSE SERPL-MCNC: 76 MG/DL (ref 74–99)
HCO3 BLDA-SCNC: 22.6 MMOL/L (ref 22–26)
HCT VFR BLD AUTO: 34.9 % (ref 41–52)
HCT VFR BLD AUTO: 39.2 % (ref 41–52)
HCT VFR BLD EST: 38 % (ref 41–52)
HGB BLD-MCNC: 11.4 G/DL (ref 13.5–17.5)
HGB BLD-MCNC: 12.6 G/DL (ref 13.5–17.5)
HGB BLDA-MCNC: 12.8 G/DL (ref 13.5–17.5)
HOLD SPECIMEN: NORMAL
HOLD SPECIMEN: NORMAL
INHALED O2 CONCENTRATION: 28 %
INR PPP: 1 (ref 0.9–1.1)
LACTATE BLDA-SCNC: 7.6 MMOL/L (ref 0.4–2)
LACTATE SERPL-SCNC: 1.1 MMOL/L (ref 0.4–2)
LACTATE SERPL-SCNC: 6.6 MMOL/L (ref 0.4–2)
MCH RBC QN AUTO: 29.5 PG (ref 26–34)
MCH RBC QN AUTO: 29.6 PG (ref 26–34)
MCHC RBC AUTO-ENTMCNC: 32.1 G/DL (ref 32–36)
MCHC RBC AUTO-ENTMCNC: 32.7 G/DL (ref 32–36)
MCV RBC AUTO: 90 FL (ref 80–100)
MCV RBC AUTO: 92 FL (ref 80–100)
NRBC BLD-RTO: 0 /100 WBCS (ref 0–0)
NRBC BLD-RTO: 0 /100 WBCS (ref 0–0)
OXYHGB MFR BLDA: 92.5 % (ref 94–98)
PCO2 BLDA: 48 MM HG (ref 38–42)
PH BLDA: 7.28 PH (ref 7.38–7.42)
PHOSPHATE SERPL-MCNC: 6.3 MG/DL (ref 2.5–4.9)
PLATELET # BLD AUTO: 103 X10*3/UL (ref 150–450)
PLATELET # BLD AUTO: 118 X10*3/UL (ref 150–450)
PMV BLD AUTO: 10.2 FL (ref 7.5–11.5)
PMV BLD AUTO: 9.5 FL (ref 7.5–11.5)
PO2 BLDA: 82 MM HG (ref 85–95)
POTASSIUM BLDA-SCNC: 4.8 MMOL/L (ref 3.5–5.3)
POTASSIUM SERPL-SCNC: 4.6 MMOL/L (ref 3.5–5.3)
POTASSIUM SERPL-SCNC: 5 MMOL/L (ref 3.5–5.3)
POTASSIUM SERPL-SCNC: 5.1 MMOL/L (ref 3.5–5.3)
PROT SERPL-MCNC: 7.5 G/DL (ref 6.4–8.2)
PROTHROMBIN TIME: 11.5 SECONDS (ref 9.8–12.8)
RBC # BLD AUTO: 3.87 X10*6/UL (ref 4.5–5.9)
RBC # BLD AUTO: 4.25 X10*6/UL (ref 4.5–5.9)
SAO2 % BLDA: 96 % (ref 94–100)
SODIUM BLDA-SCNC: 132 MMOL/L (ref 136–145)
SODIUM SERPL-SCNC: 136 MMOL/L (ref 136–145)
SODIUM SERPL-SCNC: 136 MMOL/L (ref 136–145)
SODIUM SERPL-SCNC: 137 MMOL/L (ref 136–145)
URATE SERPL-MCNC: 4.9 MG/DL (ref 4–7.5)
WBC # BLD AUTO: 12.8 X10*3/UL (ref 4.4–11.3)
WBC # BLD AUTO: 9.5 X10*3/UL (ref 4.4–11.3)

## 2023-10-15 PROCEDURE — 70450 CT HEAD/BRAIN W/O DYE: CPT | Performed by: RADIOLOGY

## 2023-10-15 PROCEDURE — 84550 ASSAY OF BLOOD/URIC ACID: CPT | Performed by: INTERNAL MEDICINE

## 2023-10-15 PROCEDURE — 85610 PROTHROMBIN TIME: CPT

## 2023-10-15 PROCEDURE — 84146 ASSAY OF PROLACTIN: CPT | Mod: CMCLAB,PARLAB | Performed by: INTERNAL MEDICINE

## 2023-10-15 PROCEDURE — 2500000001 HC RX 250 WO HCPCS SELF ADMINISTERED DRUGS (ALT 637 FOR MEDICARE OP): Performed by: INTERNAL MEDICINE

## 2023-10-15 PROCEDURE — 36600 WITHDRAWAL OF ARTERIAL BLOOD: CPT

## 2023-10-15 PROCEDURE — 80053 COMPREHEN METABOLIC PANEL: CPT | Performed by: INTERNAL MEDICINE

## 2023-10-15 PROCEDURE — 99233 SBSQ HOSP IP/OBS HIGH 50: CPT | Performed by: INTERNAL MEDICINE

## 2023-10-15 PROCEDURE — 2500000004 HC RX 250 GENERAL PHARMACY W/ HCPCS (ALT 636 FOR OP/ED): Performed by: INTERNAL MEDICINE

## 2023-10-15 PROCEDURE — 96376 TX/PRO/DX INJ SAME DRUG ADON: CPT

## 2023-10-15 PROCEDURE — 36415 COLL VENOUS BLD VENIPUNCTURE: CPT | Performed by: INTERNAL MEDICINE

## 2023-10-15 PROCEDURE — 2500000004 HC RX 250 GENERAL PHARMACY W/ HCPCS (ALT 636 FOR OP/ED)

## 2023-10-15 PROCEDURE — G0378 HOSPITAL OBSERVATION PER HR: HCPCS

## 2023-10-15 PROCEDURE — 36415 COLL VENOUS BLD VENIPUNCTURE: CPT

## 2023-10-15 PROCEDURE — 82947 ASSAY GLUCOSE BLOOD QUANT: CPT | Mod: 91

## 2023-10-15 PROCEDURE — 96375 TX/PRO/DX INJ NEW DRUG ADDON: CPT

## 2023-10-15 PROCEDURE — 71275 CT ANGIOGRAPHY CHEST: CPT | Performed by: STUDENT IN AN ORGANIZED HEALTH CARE EDUCATION/TRAINING PROGRAM

## 2023-10-15 PROCEDURE — 99233 SBSQ HOSP IP/OBS HIGH 50: CPT

## 2023-10-15 PROCEDURE — 71275 CT ANGIOGRAPHY CHEST: CPT

## 2023-10-15 PROCEDURE — 76870 US EXAM SCROTUM: CPT | Performed by: RADIOLOGY

## 2023-10-15 PROCEDURE — 70450 CT HEAD/BRAIN W/O DYE: CPT | Mod: 59

## 2023-10-15 PROCEDURE — 85027 COMPLETE CBC AUTOMATED: CPT

## 2023-10-15 PROCEDURE — 80048 BASIC METABOLIC PNL TOTAL CA: CPT | Mod: CCI

## 2023-10-15 PROCEDURE — 85027 COMPLETE CBC AUTOMATED: CPT | Performed by: INTERNAL MEDICINE

## 2023-10-15 PROCEDURE — 84132 ASSAY OF SERUM POTASSIUM: CPT

## 2023-10-15 PROCEDURE — 70450 CT HEAD/BRAIN W/O DYE: CPT

## 2023-10-15 PROCEDURE — 83605 ASSAY OF LACTIC ACID: CPT

## 2023-10-15 PROCEDURE — 96366 THER/PROPH/DIAG IV INF ADDON: CPT

## 2023-10-15 RX ORDER — HYDRALAZINE HYDROCHLORIDE 20 MG/ML
10 INJECTION INTRAMUSCULAR; INTRAVENOUS ONCE
Status: COMPLETED | OUTPATIENT
Start: 2023-10-15 | End: 2023-10-15

## 2023-10-15 RX ORDER — LEVETIRACETAM 10 MG/ML
1000 INJECTION INTRAVASCULAR ONCE
Status: COMPLETED | OUTPATIENT
Start: 2023-10-15 | End: 2023-10-15

## 2023-10-15 RX ORDER — ENOXAPARIN SODIUM 100 MG/ML
30 INJECTION SUBCUTANEOUS DAILY
Status: CANCELLED | OUTPATIENT
Start: 2023-10-15

## 2023-10-15 RX ORDER — HYDROXYZINE HYDROCHLORIDE 25 MG/1
25 TABLET, FILM COATED ORAL EVERY 6 HOURS PRN
Status: DISCONTINUED | OUTPATIENT
Start: 2023-10-15 | End: 2023-10-20 | Stop reason: HOSPADM

## 2023-10-15 RX ORDER — LORAZEPAM 2 MG/ML
1 INJECTION INTRAMUSCULAR ONCE
Status: COMPLETED | OUTPATIENT
Start: 2023-10-15 | End: 2023-10-15

## 2023-10-15 RX ORDER — HEPARIN SODIUM 1000 [USP'U]/ML
2000 INJECTION, SOLUTION INTRAVENOUS; SUBCUTANEOUS
Status: DISCONTINUED | OUTPATIENT
Start: 2023-10-16 | End: 2023-10-20 | Stop reason: HOSPADM

## 2023-10-15 RX ORDER — CLOPIDOGREL BISULFATE 75 MG/1
75 TABLET ORAL DAILY
Status: CANCELLED | OUTPATIENT
Start: 2023-10-15

## 2023-10-15 RX ORDER — HYDRALAZINE HYDROCHLORIDE 50 MG/1
100 TABLET, FILM COATED ORAL 2 TIMES DAILY
Status: DISCONTINUED | OUTPATIENT
Start: 2023-10-15 | End: 2023-10-20 | Stop reason: HOSPADM

## 2023-10-15 RX ORDER — LEVETIRACETAM 500 MG/1
500 TABLET ORAL DAILY
Status: DISCONTINUED | OUTPATIENT
Start: 2023-10-16 | End: 2023-10-15

## 2023-10-15 RX ORDER — HYDRALAZINE HYDROCHLORIDE 20 MG/ML
10 INJECTION INTRAMUSCULAR; INTRAVENOUS EVERY 6 HOURS PRN
Status: DISCONTINUED | OUTPATIENT
Start: 2023-10-15 | End: 2023-10-20 | Stop reason: HOSPADM

## 2023-10-15 RX ORDER — SODIUM CHLORIDE 9 MG/ML
100 INJECTION, SOLUTION INTRAVENOUS CONTINUOUS
Status: ACTIVE | OUTPATIENT
Start: 2023-10-15 | End: 2023-10-16

## 2023-10-15 RX ORDER — LEVETIRACETAM 500 MG/1
500 TABLET ORAL 2 TIMES DAILY
Status: DISCONTINUED | OUTPATIENT
Start: 2023-10-15 | End: 2023-10-16

## 2023-10-15 RX ADMIN — LEVETIRACETAM 1000 MG: 10 INJECTION INTRAVENOUS at 16:52

## 2023-10-15 RX ADMIN — GABAPENTIN 100 MG: 100 CAPSULE ORAL at 21:14

## 2023-10-15 RX ADMIN — CALCIUM ACETATE 1334 MG: 667 CAPSULE ORAL at 16:13

## 2023-10-15 RX ADMIN — MINOXIDIL 10 MG: 10 TABLET ORAL at 09:25

## 2023-10-15 RX ADMIN — TRAMADOL HYDROCHLORIDE 50 MG: 50 TABLET, COATED ORAL at 12:12

## 2023-10-15 RX ADMIN — HYDRALAZINE HYDROCHLORIDE 10 MG: 20 INJECTION INTRAMUSCULAR; INTRAVENOUS at 08:00

## 2023-10-15 RX ADMIN — GUAIFENESIN 600 MG: 600 TABLET, EXTENDED RELEASE ORAL at 21:15

## 2023-10-15 RX ADMIN — MINOXIDIL 10 MG: 10 TABLET ORAL at 21:12

## 2023-10-15 RX ADMIN — TRAMADOL HYDROCHLORIDE 50 MG: 50 TABLET, COATED ORAL at 00:16

## 2023-10-15 RX ADMIN — CARVEDILOL 25 MG: 25 TABLET, FILM COATED ORAL at 09:24

## 2023-10-15 RX ADMIN — CALCIUM ACETATE 1334 MG: 667 CAPSULE ORAL at 09:24

## 2023-10-15 RX ADMIN — MIRTAZAPINE 7.5 MG: 15 TABLET, FILM COATED ORAL at 21:12

## 2023-10-15 RX ADMIN — GUAIFENESIN 600 MG: 600 TABLET, EXTENDED RELEASE ORAL at 09:25

## 2023-10-15 RX ADMIN — AMLODIPINE BESYLATE 10 MG: 10 TABLET ORAL at 09:24

## 2023-10-15 RX ADMIN — PHENAZOPYRIDINE HYDROCHLORIDE 200 MG: 200 TABLET ORAL at 09:25

## 2023-10-15 RX ADMIN — HYDRALAZINE HYDROCHLORIDE 50 MG: 50 TABLET ORAL at 09:24

## 2023-10-15 RX ADMIN — CARVEDILOL 25 MG: 25 TABLET, FILM COATED ORAL at 16:13

## 2023-10-15 RX ADMIN — LORAZEPAM 1 MG: 2 INJECTION INTRAMUSCULAR; INTRAVENOUS at 16:13

## 2023-10-15 RX ADMIN — ONDANSETRON 4 MG: 2 INJECTION INTRAMUSCULAR; INTRAVENOUS at 12:12

## 2023-10-15 RX ADMIN — PHENAZOPYRIDINE HYDROCHLORIDE 200 MG: 200 TABLET ORAL at 21:11

## 2023-10-15 RX ADMIN — CALCIUM ACETATE 1334 MG: 667 CAPSULE ORAL at 21:13

## 2023-10-15 RX ADMIN — TRAMADOL HYDROCHLORIDE 50 MG: 50 TABLET, COATED ORAL at 06:13

## 2023-10-15 RX ADMIN — ATORVASTATIN CALCIUM 40 MG: 40 TABLET, FILM COATED ORAL at 21:13

## 2023-10-15 RX ADMIN — SODIUM CHLORIDE 100 ML/HR: 9 INJECTION, SOLUTION INTRAVENOUS at 16:02

## 2023-10-15 RX ADMIN — TRAMADOL HYDROCHLORIDE 50 MG: 50 TABLET, COATED ORAL at 21:20

## 2023-10-15 RX ADMIN — CEFTRIAXONE SODIUM 1 G: 1 INJECTION, SOLUTION INTRAVENOUS at 06:14

## 2023-10-15 RX ADMIN — HYDRALAZINE HYDROCHLORIDE 100 MG: 50 TABLET ORAL at 21:15

## 2023-10-15 ASSESSMENT — COGNITIVE AND FUNCTIONAL STATUS - GENERAL
WALKING IN HOSPITAL ROOM: A LITTLE
DAILY ACTIVITIY SCORE: 22
MOBILITY SCORE: 19
STANDING UP FROM CHAIR USING ARMS: A LITTLE
CLIMB 3 TO 5 STEPS WITH RAILING: A LOT
HELP NEEDED FOR BATHING: A LITTLE
MOVING TO AND FROM BED TO CHAIR: A LITTLE
DRESSING REGULAR LOWER BODY CLOTHING: A LITTLE

## 2023-10-15 ASSESSMENT — ENCOUNTER SYMPTOMS
CHILLS: 0
DIFFICULTY URINATING: 0
FEVER: 0

## 2023-10-15 ASSESSMENT — PAIN SCALES - GENERAL
PAINLEVEL_OUTOF10: 10 - WORST POSSIBLE PAIN
PAINLEVEL_OUTOF10: 6
PAINLEVEL_OUTOF10: 7
PAINLEVEL_OUTOF10: 0 - NO PAIN
PAINLEVEL_OUTOF10: 8

## 2023-10-15 ASSESSMENT — PAIN - FUNCTIONAL ASSESSMENT
PAIN_FUNCTIONAL_ASSESSMENT: 0-10
PAIN_FUNCTIONAL_ASSESSMENT: 0-10

## 2023-10-15 NOTE — CODE DOCUMENTATION
Rapid response  was called at 07:50 for patient who was found on the floor in a pool of blood with a laceration/ hematoma on left frontal scalp area due to unwitnessed fall while using the bathroom, pt is currently on blood thinners. Upon arrival pt was hypertensive . Patient oriented & Alert, no distress noted.     BP on arrival : /116 HR 84   EKG showed NSR  Patient Given 10 mg of hydralazine Per Dr. Hudson verbal order  BP post 10 mg of hydralazine : BP  213/106     ICU Nurse dismissed , no further intervention needed.  Patient taken to CT scan.

## 2023-10-15 NOTE — PROGRESS NOTES
Ponce Do is a 58 y.o. male on day 0 of admission presenting with Hypertensive urgency.      Subjective     Patient fell today with some laceration on the scalp frontal area he cannot recall if he lost consciousness.  Patient is receiving Plavix and Lovenox.  They have elevation of the D-dimer and CT of chest  angio is pending to rule out PE,  Patient will be dialyze in the morning  Patient blood pressure continue to be elevated dose of medications to be adjusted       Objective          Vitals 24 HR  Heart Rate:  [67-78]   Temp:  [36.3 °C (97.3 °F)-36.9 °C (98.4 °F)]   Resp:  [16-20]   BP: (175-201)/()   SpO2:  [93 %-97 %]         Intake/Output last 3 Shifts:  No intake or output data in the 24 hours ending 10/15/23 1229    Physical Exam    Relevant Results  Here to assess if you would like to pull medications.med's would like to link you are delivered a healthy  No current facility-administered medications on file prior to encounter.     Current Outpatient Medications on File Prior to Encounter   Medication Sig Dispense Refill    amLODIPine (Norvasc) 10 mg tablet Take 1 tablet (10 mg) by mouth once daily.      atorvastatin (Lipitor) 40 mg tablet Take 1 tablet (40 mg) by mouth once daily at bedtime. Take at bedtime      calcium acetate (Phoslo) 667 mg capsule Take 2 capsules (1,334 mg) by mouth 3 times a day.      clopidogrel (Plavix) 75 mg tablet Take 1 tablet (75 mg) by mouth once daily.      gabapentin (Neurontin) 100 mg capsule Take 1 capsule (100 mg) by mouth once daily at bedtime.      guaiFENesin (Mucinex) 600 mg 12 hr tablet Take 1 tablet (600 mg) by mouth 2 times a day. Do not crush, chew, or split.      hydrALAZINE (Apresoline) 50 mg tablet Take 1 tablet (50 mg) by mouth 3 times a day.      minoxidil (Loniten) 2.5 mg tablet Take 2 tablets (5 mg) by mouth once daily.      mirtazapine (Remeron) 7.5 mg tablet Take 1 tablet (7.5 mg) by mouth once daily at bedtime.          Results for orders placed  or performed during the hospital encounter of 10/13/23 (from the past 24 hour(s))   D-dimer, Non VTE   Result Value Ref Range    D-Dimer Non VTE, Quant (ng/mL FEU) 2,643 (H) <=500 ng/mL FEU   SST TOP   Result Value Ref Range    Extra Tube Hold for add-ons.    Renal function panel   Result Value Ref Range    Glucose 76 74 - 99 mg/dL    Sodium 136 136 - 145 mmol/L    Potassium 5.1 3.5 - 5.3 mmol/L    Chloride 95 (L) 98 - 107 mmol/L    Bicarbonate 28 21 - 32 mmol/L    Anion Gap 18 10 - 20 mmol/L    Urea Nitrogen 50 (H) 6 - 23 mg/dL    Creatinine 8.65 (H) 0.50 - 1.30 mg/dL    eGFR 7 (L) >60 mL/min/1.73m*2    Calcium 7.8 (L) 8.6 - 10.3 mg/dL    Phosphorus 6.3 (H) 2.5 - 4.9 mg/dL    Albumin 3.4 3.4 - 5.0 g/dL   CBC   Result Value Ref Range    WBC 9.5 4.4 - 11.3 x10*3/uL    nRBC 0.0 0.0 - 0.0 /100 WBCs    RBC 3.87 (L) 4.50 - 5.90 x10*6/uL    Hemoglobin 11.4 (L) 13.5 - 17.5 g/dL    Hematocrit 34.9 (L) 41.0 - 52.0 %    MCV 90 80 - 100 fL    MCH 29.5 26.0 - 34.0 pg    MCHC 32.7 32.0 - 36.0 g/dL    RDW 18.4 (H) 11.5 - 14.5 %    Platelets 103 (L) 150 - 450 x10*3/uL    MPV 10.2 7.5 - 11.5 fL   Uric Acid   Result Value Ref Range    Uric Acid 4.9 4.0 - 7.5 mg/dL   Basic metabolic panel   Result Value Ref Range    Glucose 76 74 - 99 mg/dL    Sodium 137 136 - 145 mmol/L    Potassium 5.0 3.5 - 5.3 mmol/L    Chloride 95 (L) 98 - 107 mmol/L    Bicarbonate 28 21 - 32 mmol/L    Anion Gap 19 10 - 20 mmol/L    Urea Nitrogen 48 (H) 6 - 23 mg/dL    Creatinine 8.18 (H) 0.50 - 1.30 mg/dL    eGFR 7 (L) >60 mL/min/1.73m*2    Calcium 8.1 (L) 8.6 - 10.3 mg/dL   Coagulation Screen   Result Value Ref Range    Protime 11.5 9.8 - 12.8 seconds    INR 1.0 0.9 - 1.1    aPTT 33 27 - 38 seconds   Green Top   Result Value Ref Range    Extra Tube Hold for add-ons.    Lavender Top   Result Value Ref Range    Extra Tube Hold for add-ons.     O;  GA; alert oriented  HEENT; extraocular movements are intact there is no nystagmus no ptosis  Pupils react to light  and accommodation  Neck; no bruit, no jugular venous distention, no adenopathy  Lungs; clear to auscultation and percussion  Heart; 1-2/6 no sternal or systolic murmur no gallop no rub or thrill  Abdomen; active peristalsis no rebound or guarding but shift and dullness  Extremities; no edema  Neurologic:; No tremors, no clonus, no asterixis     Assessment/Plan   Hypertension severe  End-stage renal disease  Thrombocytopenia  Elevation of the D-dimer suggesting thrombophilia  Cystitis  History of testicular cancer        Plan adjust antihypertensives  Dialysis in the morning                Aftab Pelletier MD

## 2023-10-15 NOTE — PROGRESS NOTES
"CLINICAL EVENT NOTE - RAPID RESPONSE - Seizure    Ponce Do is a 58 y.o. male is a 57yo man with past medical history of CVA, ESRD on HD, HTN who is currently being treated by hypertensive urgency. Earlier today patient had a mechanical fall with head trauma (See Rapid Response note). CTH showed no acute intracranial pathology and showed scalp hematoma.  Following this event, patient was noted by staff to have transient left sided facial numbness.    Rapid response was called at 15:06. Patient was being transferred to radiology for a CTA due to concerns for possible PE in the setting of elevated d-dimer. Patient was noted by staff to be displaying seizure-like activity - rhythmically moving all 4 extremities and was described as \"thrashing\" in his bed. When response team arrived, patient was obtunded, with no response to noxious stimuli. Vitals were stable. POCT glucose showed no hypoglycemia. During response team, patient became more alert and did not display seizure activity before returning to initial level of arousal. CTH was done which showed no intracranial hemorrhage. ABG was done which showed acidosis and elevated lactate to 7.6. Patient was protecting his airway. Primary team was notified of event and patient was transferred to step-down unit/8th floor.    Objective:  GEN: Obtunded  HEENT: Left-sided scalp laceration bandaged without significant bleeding  CV: S1, S2, regular, no murmur  PULM: CTAB  ABD: soft, ND  EXT: no LE edema  NEURO: AOx0, non contracted, no rhythmic movements      Assessment and Plan:  #New onset seizure - possible withdrawal etiology  #Lactic acidosis  #AMS in setting of above  - Will give 1mg ativan and 1000mg Keppra now, continue with 500mg BID keppra  - Neurology consulted  - Will get EEG  - Labs: CBC, CMP, Lactate  - Will check Utox, consider PRN ativan  - Continue Q1 neurochecks  - NS 100ml/hr for 1L    This is a preliminary note, please await attending attestation for a " finalized plan.    Larry Delarosa MD  Internal Medicine PGY2  Please DocHalo me with any questions

## 2023-10-15 NOTE — PROGRESS NOTES
Rapid response was called at 07:48 to evaluate the patient who was found on the floor with a pool of blood.  Patient had an unwitnessed fall while using the bathroom.  He denies losing consciousness and says he tripped.  He was found to be hypertensive and tachycardic.  Briefly this is a 58-year-old male who presented for hypertension and had a dialysis session yesterday with persistent hypotension.  He is on blood thinners.  Patient was not given his morning antihypertensive medications yet.  Upon evaluation, patient was given 10 mg hydralazine.    Initial vitals:   /121  HR 79  SPO2 94%    Objective:  GENERAL:  Alert, oriented, and in no distress  HEAD:  Normocephalic, atraumatic  EYES:  Extraocular muscles intact  ENT: Lesion with blood on the left side of scalp  CARDIOVASCULAR:  RRR, no murmurs or gallops, intact peripheral pulses  RESPIRATORY:  Appropriate respiratory effort, no wheezing crackles or tachypnea  ABDOMEN:  Soft and nontender without organomegaly or peritoneal signs  EXTREMITIES:  Pulses intact, full strength and ROM  NEUROLOGICAL:  CN grossly intact, arousable and appropriately interactive    Assessment & Plan:  #Mechanical fall  #Hypertensive urgency  #Head laceration  Patient was given 10 mg hydralazine  Stat head CT  Hold blood thinners  Q1h neurochecks and vitals

## 2023-10-15 NOTE — CARE PLAN
The patient's goals for the shift include comfort and rest.    The clinical goals for the shift include safety and fall prevention

## 2023-10-15 NOTE — CONSULTS
Reason For Consult  Hx of Testicular CA    History Of Present Illness  Ponce Do is a 58 y.o. male presenting with hypertension at dialysis. Hx of testicular cancer, pt states had his L testicle removed when he was in his 20s. No issues since, no testicular pain, difficulties voiding. Has been having L groin/hip pain for the past 2 weeks. States constant 10/10 pain, same amount of pain with movement but somewhat better with rest. Pain radiates to his low back. No fever/chills. Pt appears restless on consultation.      Past Medical History  He has a past medical history of Cancer (CMS/HCC).    Surgical History  He has a past surgical history that includes MR angio head wo IV contrast (6/16/2021) and MR angio neck wo IV contrast (6/16/2021).     Social History  He reports that he has been smoking cigarettes. He has a 15.00 pack-year smoking history. He has never used smokeless tobacco. He reports that he does not currently use alcohol. He reports that he does not use drugs.    Family History  No family history on file.     Allergies  Patient has no known allergies.    Review of Systems   Constitutional:  Negative for chills and fever.   Genitourinary:  Negative for difficulty urinating, penile pain, penile swelling, scrotal swelling and testicular pain.         Physical Exam  Restless  R testicle without any masses or infx on exam  Penis normal appearing  L groin/hip/perineum without infx rosanna necrosis or crepitus       Current Facility-Administered Medications:     amLODIPine (Norvasc) tablet 10 mg, 10 mg, oral, Daily, Enrique Gomez MD, 10 mg at 10/14/23 2036    atorvastatin (Lipitor) tablet 40 mg, 40 mg, oral, Nightly, Enrique Gomez MD, 40 mg at 10/14/23 2037    calcium acetate (Phoslo) capsule 1,334 mg, 1,334 mg, oral, TID, Enrique Gomez MD, 1,334 mg at 10/14/23 2035    carvedilol (Coreg) tablet 25 mg, 25 mg, oral, BID with meals, Aftab Pelletier MD, 25 mg at 10/14/23 1642    cefTRIAXone  "(Rocephin) IVPB 1 g, 1 g, intravenous, q24h, Enrique Gomez MD, Stopped at 10/15/23 0644    cefTRIAXone (Rocephin) IVPB 1 g, 1 g, intravenous, Once, Enrique Gomez MD    clopidogrel (Plavix) tablet 75 mg, 75 mg, oral, Daily, Enrique Gomez MD, 75 mg at 10/14/23 2036    enoxaparin (Lovenox) syringe 30 mg, 30 mg, subcutaneous, Daily, Aftab Pelletier MD, 30 mg at 10/14/23 1420    gabapentin (Neurontin) capsule 100 mg, 100 mg, oral, Nightly, Enrique Gomez MD, 100 mg at 10/14/23 2037    guaiFENesin (Mucinex) 12 hr tablet 600 mg, 600 mg, oral, BID, Enrique Gomez MD, 600 mg at 10/14/23 2037    hydrALAZINE (Apresoline) injection 10 mg, 10 mg, intravenous, q6h PRN, Lydia Tobias, SHINE-CNP    hydrALAZINE (Apresoline) tablet 50 mg, 50 mg, oral, TID, Enrique Gomez MD, 50 mg at 10/14/23 2036    ipratropium-albuteroL (Duo-Neb) 0.5-2.5 mg/3 mL nebulizer solution 3 mL, 3 mL, nebulization, q2h PRN, Enrique Gomez MD    minoxidil (Loniten) tablet 10 mg, 10 mg, oral, BID, Aftab Pelletier MD, 10 mg at 10/14/23 1420    minoxidil (Loniten) tablet 5 mg, 5 mg, oral, Daily, Enrique Gomez MD    mirtazapine (Remeron) tablet 7.5 mg, 7.5 mg, oral, Nightly, Enrique Gomez MD, 7.5 mg at 10/14/23 2037    ondansetron (Zofran) injection 4 mg, 4 mg, intravenous, q4h PRN, Enrique Gomez MD, 4 mg at 10/14/23 2056    phenazopyridine (Pyridium) tablet 200 mg, 200 mg, oral, BID, Enrique Gomez MD, 200 mg at 10/14/23 2037    traMADol (Ultram) tablet 50 mg, 50 mg, oral, q6h PRN, Enrique Gomez MD, 50 mg at 10/15/23 0613     Last Recorded Vitals  Blood pressure (!) 175/99, pulse 74, temperature 36.6 °C (97.9 °F), resp. rate 20, height 1.702 m (5' 7\"), weight 58.1 kg (128 lb), SpO2 93 %.    Relevant Results  Results for orders placed or performed during the hospital encounter of 10/13/23 (from the past 96 hour(s))   Troponin I, High Sensitivity   Result Value Ref Range    Troponin I, High Sensitivity 25 (H) 0 - 20 " ng/L   Magnesium   Result Value Ref Range    Magnesium 2.04 1.60 - 2.40 mg/dL   Comprehensive Metabolic Panel   Result Value Ref Range    Glucose 126 (H) 74 - 99 mg/dL    Sodium 135 (L) 136 - 145 mmol/L    Potassium 4.7 3.5 - 5.3 mmol/L    Chloride 99 98 - 107 mmol/L    Bicarbonate 25 21 - 32 mmol/L    Anion Gap 16 10 - 20 mmol/L    Urea Nitrogen 19 6 - 23 mg/dL    Creatinine 4.66 (H) 0.50 - 1.30 mg/dL    eGFR 14 (L) >60 mL/min/1.73m*2    Calcium 8.5 (L) 8.6 - 10.3 mg/dL    Albumin 4.0 3.4 - 5.0 g/dL    Alkaline Phosphatase 54 33 - 120 U/L    Total Protein 7.8 6.4 - 8.2 g/dL    AST 25 9 - 39 U/L    Bilirubin, Total 0.4 0.0 - 1.2 mg/dL    ALT 16 10 - 52 U/L   CBC and Auto Differential   Result Value Ref Range    WBC 8.6 4.4 - 11.3 x10*3/uL    nRBC 0.0 0.0 - 0.0 /100 WBCs    RBC 4.10 (L) 4.50 - 5.90 x10*6/uL    Hemoglobin 12.2 (L) 13.5 - 17.5 g/dL    Hematocrit 37.2 (L) 41.0 - 52.0 %    MCV 91 80 - 100 fL    MCH 29.8 26.0 - 34.0 pg    MCHC 32.8 32.0 - 36.0 g/dL    RDW 18.6 (H) 11.5 - 14.5 %    Platelets 102 (L) 150 - 450 x10*3/uL    MPV 10.8 7.5 - 11.5 fL    Neutrophils % 74.6 40.0 - 80.0 %    Immature Granulocytes %, Automated 0.7 0.0 - 0.9 %    Lymphocytes % 16.6 13.0 - 44.0 %    Monocytes % 4.7 2.0 - 10.0 %    Eosinophils % 2.9 0.0 - 6.0 %    Basophils % 0.5 0.0 - 2.0 %    Neutrophils Absolute 6.41 1.20 - 7.70 x10*3/uL    Immature Granulocytes Absolute, Automated 0.06 0.00 - 0.70 x10*3/uL    Lymphocytes Absolute 1.42 1.20 - 4.80 x10*3/uL    Monocytes Absolute 0.40 0.10 - 1.00 x10*3/uL    Eosinophils Absolute 0.25 0.00 - 0.70 x10*3/uL    Basophils Absolute 0.04 0.00 - 0.10 x10*3/uL   B-type natriuretic peptide   Result Value Ref Range    BNP 1,482 (H) 0 - 99 pg/mL   Urinalysis with Reflex Microscopic and Culture   Result Value Ref Range    Color, Urine Straw Straw, Yellow    Appearance, Urine Clear Clear    Specific Gravity, Urine 1.008 1.005 - 1.035    pH, Urine 9.0 (N) 5.0, 5.5, 6.0, 6.5, 7.0, 7.5, 8.0    Protein,  Urine >=500 (3+) (N) NEGATIVE mg/dL    Glucose, Urine 150 (2+) (A) NEGATIVE mg/dL    Blood, Urine NEGATIVE NEGATIVE    Ketones, Urine NEGATIVE NEGATIVE mg/dL    Bilirubin, Urine NEGATIVE NEGATIVE    Urobilinogen, Urine <2.0 <2.0 mg/dL    Nitrite, Urine NEGATIVE NEGATIVE    Leukocyte Esterase, Urine TRACE (A) NEGATIVE   Extra Urine Gray Tube   Result Value Ref Range    Extra Tube Hold for add-ons.    Urinalysis Microscopic Only   Result Value Ref Range    WBC, Urine 11-20 (A) 1-5, NONE /HPF    RBC, Urine 3-5 NONE, 1-2, 3-5 /HPF   Urine Culture    Specimen: Straight Catheter; Urine   Result Value Ref Range    Urine Culture No significant growth    Blood Culture    Specimen: Peripheral Venipuncture; Blood culture   Result Value Ref Range    Blood Culture Loaded on Instrument - Culture in progress    Blood Culture    Specimen: Peripheral Venipuncture; Blood culture   Result Value Ref Range    Blood Culture Loaded on Instrument - Culture in progress    Troponin I, High Sensitivity   Result Value Ref Range    Troponin I, High Sensitivity 42 (H) 0 - 20 ng/L   D-dimer, Non VTE   Result Value Ref Range    D-Dimer Non VTE, Quant (ng/mL FEU) 2,643 (H) <=500 ng/mL FEU   SST TOP   Result Value Ref Range    Extra Tube Hold for add-ons.       Vascular US lower extremity venous duplex bilateral    Result Date: 10/14/2023  Interpreted By:  Ponce Nunes, STUDY: [facility] US LOWER EXTREMITY VENOUS DUPLEX BILATERAL; 10/14/2023 1:25 am   INDICATION: Signs/Symptoms:pain.   COMPARISON: None.   ACCESSION NUMBER(S): [id number]   ORDERING CLINICIAN: MARC CROFT   TECHNIQUE: 2D grayscale and color-flow duplex images were obtained along with Doppler spectral waveform analysis of the deep venous system of the lower extremity from the groin to the knee. Attempts were made to image the calf veins. Venous compression and augmentation were performed.   FINDINGS: Right Lower Extremity: There is normal compressibility and flow within the visualized  vessels of the deep venous system of this lower extremity.   Left Lower Extremity: There is normal compressibility and flow within the visualized vessels of the deep venous system of this lower extremity.         No evidence for deep venous thrombosis within the limits of this examination.   MACRO: None.   Signed by: Ponce Nunes 10/14/2023 1:43 AM Dictation workstation:   [id number]    CT pelvis wo IV contrast    Result Date: 10/14/2023  Interpreted By:  Kavon Piedra, STUDY: CT PELVIS WO IV CONTRAST;  10/13/2023 11:50 pm   INDICATION: Signs/Symptoms:left groin pain. hx of testicular cancer.   COMPARISON: 8/1/2023   ACCESSION NUMBER(S): [id number]   ORDERING CLINICIAN: MARC CROFT   TECHNIQUE: Contiguous axial images of the pelvis were obtained without intravenous contrast. Coronal and sagittal reformatted images were obtained from the axial images.   FINDINGS: Limited evaluation the soft tissues secondary to lack of intravenous. Gallstones noted in the gallbladder. No evidence of bowel obstruction.   Urinary bladder is underdistended and not well evaluated. There is however prominent urinary bladder wall thickening and edema.   Limited evaluation of the scrotum.   No evidence acute displaced pelvic fracture. Vascular calcifications.       Urinary bladder wall thickening and edema compatible with cystitis; please clinically correlate with urinalysis.   No evidence of inguinal hernia.   Limited evaluation the scrotum and if there is concern for scrotal or testicular pathology, scrotal ultrasound is recommended for further evaluation.   MACRO: None   Signed by: Kavon Piedra 10/14/2023 12:41 AM Dictation workstation:   [id number]    XR chest 2 views    Result Date: 10/13/2023  Interpreted By:  Flynn Messer, STUDY: XR CHEST 2 VIEWS;  10/13/2023 5:58 pm   INDICATION: Signs/Symptoms:CP.   COMPARISON: Chest radiograph 08/13/2023   ACCESSION NUMBER(S): [id number]   ORDERING CLINICIAN: ROBIN MCKINLEY   FINDINGS:  SUPPORT DEVICES: Right IJ central venous catheter terminates at the cavoatrial junction. Metallic device overlies the right heart, unchanged from prior exams, possibly reflecting a interatrial septal occlusion device.   CARDIOMEDIASTINAL SILHOUETTE: Cardiomediastinal silhouette is normal in size and configuration.   LUNGS: No pulmonary consolidation, pleural effusion or pneumothorax.   ABDOMEN: No remarkable upper abdominal findings.   BONES: No acute osseous abnormality.       1. No acute cardiopulmonary process.     Signed by: Flynn Messer 10/13/2023 6:33 PM Dictation workstation:   [id number]        Assessment/Plan   Hx of testicular CA/L groin/hip pain  -Exam normal  -Recommend Scrotal US  -Pain likely a lumbar radiculopathy   Hypertensive urgency/ESRD  -Per Primary Team/Nephrology    Thank you for allowing us to participate in this patient's care, we will follow.    Mihir Dobbins PA-C

## 2023-10-15 NOTE — PROGRESS NOTES
Ponce Do is a 58 y.o. male on day 0 of admission presenting with Hypertensive urgency.      Subjective   Patient with fall earlier.  Clinically unchanged.       Objective     Last Recorded Vitals  BP (!) 199/95 (BP Location: Left arm, Patient Position: Lying)   Pulse 78   Temp 36.6 °C (97.9 °F)   Resp 14   Wt 58.1 kg (128 lb)   SpO2 94%   Intake/Output last 3 Shifts:  No intake or output data in the 24 hours ending 10/15/23 1443    Admission Weight  Weight: 58.1 kg (128 lb) (10/13/23 1736)    Daily Weight  10/13/23 : 58.1 kg (128 lb)    Image Results  CT head wo IV contrast  Narrative: Interpreted By:  Annamarie Phan,   STUDY:  CT HEAD WO IV CONTRAST;  10/15/2023 8:12 am      INDICATION:  Signs/Symptoms:fall.      COMPARISON:  07/11/2023      ACCESSION NUMBER(S):  [id number]      ORDERING CLINICIAN:  ROBBY RAY      TECHNIQUE:  Examination was performed in the axial plane with sagittal and  coronal reconstructions. Bone and soft tissue algorithms were  performed.      FINDINGS:  INTRACRANIAL:  The ventricular system is symmetrical and nondilated.  No mass or mass effect is identified.  There is no hemorrhage or subdural fluid collection.  There is no acute infarct.  There is a fairly large remote right cerebellar hemispheric infarct.  There is a small left frontal scalp hematoma. There is no underlying  fracture of the calvarium.      EXTRACRANIAL:  Visualized paranasal sinuses and mastoids are clear.      Impression: 1. No acute intracranial pathology.  2. Left frontal scalp hematoma.  3. Remote right cerebellar hemispheric infarct.      MACRO:  None      Signed by: Annamarie Phan 10/15/2023 8:22 AM  Dictation workstation:   [id number]      Physical Exam    Relevant Results               Assessment/Plan   This patient currently has cardiac telemetry ordered; if you would like to modify or discontinue the telemetry order, click here to go to the orders activity to modify/discontinue the  order.            Principal Problem:    Hypertensive urgency            Enrique Gomez MD  Physician  Internal Medicine     H&P      Addendum     Date of Service: 10/14/2023  5:59 AM     Addendum       Expand All Collapse All    History Of Present Illness  Ponce Do is a 58 y.o. male presenting to emergency department from dialysis for evaluation of hypertension.  Patient was reportedly at dialysis today and remained hypertensive throughout his session.  Patient states he was able to complete his full dialysis session.  Patient was sent into emergency department for evaluation of hypertension.  Patient does report an increase in leg pain.  Patient denies chest pain or dizziness.  Patient denies recent illness, nausea, vomiting, diarrhea.     In ED, chest x-ray completed and negative for acute process per radiology review.  Bilateral lower extremity ultrasound completed and negative for acute process.  CT of the abdomen pelvis completed showing bladder wall thickening and testicular abnormalities concerning for malignancy consistent with patient's history of testicular cancer.  Hemoglobin 12.2, hematocrit 37.2, BNP 1482, troponin 25, glucose 126, sodium 135, GFR 14, creatinine 4.66.  Blood cultures and urine culture pending.  UTI positive.  Patient started on IV ceftriaxone.  Patient medicated with multiple doses of hydralazine.  Blood pressure 182/87, heart rate 82, respirations 18, temperature 36.8 °C, SPO2 98% on room air.  Patient admitted to telemetry observation under the care of Dr. Gomez who will continue to follow.  I was asked to do H&P and place initial admission orders.     Past Medical History  ESRD with hemodialysis [facility], testicular cancer, cerebral infarct with hemiplegia (right-sided), hyperkalemia, hypertension, hyponatremia, diabetes, recent pneumonia with recurrent hemoptysis     Surgical History  Bronchoscopy     Social History  He has no history on file for tobacco use,  "alcohol use, and drug use.     Family History  Reviewed and noncontributory     Allergies  NKDA/NKA     Review of Systems  A 10 point review of systems was completed and negative except what is listed in HPI  Physical Exam  Constitutional:       Appearance: Normal appearance.   HENT:      Mouth/Throat:      Mouth: Mucous membranes are moist.      Pharynx: Oropharynx is clear.   Eyes:      Pupils: Pupils are equal, round, and reactive to light.   Cardiovascular:      Rate and Rhythm: Normal rate and regular rhythm.   Pulmonary:      Effort: Pulmonary effort is normal.      Breath sounds: Normal breath sounds.   Abdominal:      General: Bowel sounds are normal.      Palpations: Abdomen is soft.   Musculoskeletal:         General: Normal range of motion.      Cervical back: Normal range of motion.   Skin:     General: Skin is warm and dry.      Capillary Refill: Capillary refill takes less than 2 seconds.   Neurological:      General: No focal deficit present.      Mental Status: He is alert and oriented to person, place, and time. Mental status is at baseline.   Psychiatric:         Mood and Affect: Mood normal.         Behavior: Behavior normal.            Last Recorded Vitals  Blood pressure (!) 182/87, pulse 82, temperature 37.1 °C (98.8 °F), resp. rate 18, height 1.702 m (5' 7\"), weight 58.1 kg (128 lb), SpO2 98 %.     Relevant Results  Vascular US lower extremity venous duplex bilateral     Result Date: 10/14/2023  Interpreted By:  Ponce Nunes, STUDY: [facility] US LOWER EXTREMITY VENOUS DUPLEX BILATERAL; 10/14/2023 1:25 am   INDICATION: Signs/Symptoms:pain.   COMPARISON: None.   ACCESSION NUMBER(S): [id number]   ORDERING CLINICIAN: MARC CROFT   TECHNIQUE: 2D grayscale and color-flow duplex images were obtained along with Doppler spectral waveform analysis of the deep venous system of the lower extremity from the groin to the knee. Attempts were made to image the calf veins. Venous compression and augmentation " were performed.   FINDINGS: Right Lower Extremity: There is normal compressibility and flow within the visualized vessels of the deep venous system of this lower extremity.   Left Lower Extremity: There is normal compressibility and flow within the visualized vessels of the deep venous system of this lower extremity.          No evidence for deep venous thrombosis within the limits of this examination.   MACRO: None.   Signed by: Ponce Nunes 10/14/2023 1:43 AM Dictation workstation:   [id number]     CT pelvis wo IV contrast     Result Date: 10/14/2023  Interpreted By:  Kavon Piedra, STUDY: CT PELVIS WO IV CONTRAST;  10/13/2023 11:50 pm   INDICATION: Signs/Symptoms:left groin pain. hx of testicular cancer.   COMPARISON: 8/1/2023   ACCESSION NUMBER(S): [id number]   ORDERING CLINICIAN: MARC CROFT   TECHNIQUE: Contiguous axial images of the pelvis were obtained without intravenous contrast. Coronal and sagittal reformatted images were obtained from the axial images.   FINDINGS: Limited evaluation the soft tissues secondary to lack of intravenous. Gallstones noted in the gallbladder. No evidence of bowel obstruction.   Urinary bladder is underdistended and not well evaluated. There is however prominent urinary bladder wall thickening and edema.   Limited evaluation of the scrotum.   No evidence acute displaced pelvic fracture. Vascular calcifications.        Urinary bladder wall thickening and edema compatible with cystitis; please clinically correlate with urinalysis.   No evidence of inguinal hernia.   Limited evaluation the scrotum and if there is concern for scrotal or testicular pathology, scrotal ultrasound is recommended for further evaluation.   MACRO: None   Signed by: Kavon Piedra 10/14/2023 12:41 AM Dictation workstation:   [id number]     XR chest 2 views     Result Date: 10/13/2023  Interpreted By:  Flynn Messer, STUDY: XR CHEST 2 VIEWS;  10/13/2023 5:58 pm   INDICATION: Signs/Symptoms:CP.    COMPARISON: Chest radiograph 08/13/2023   ACCESSION NUMBER(S): [id number]   ORDERING CLINICIAN: ROBIN MCKINLEY   FINDINGS: SUPPORT DEVICES: Right IJ central venous catheter terminates at the cavoatrial junction. Metallic device overlies the right heart, unchanged from prior exams, possibly reflecting a interatrial septal occlusion device.   CARDIOMEDIASTINAL SILHOUETTE: Cardiomediastinal silhouette is normal in size and configuration.   LUNGS: No pulmonary consolidation, pleural effusion or pneumothorax.   ABDOMEN: No remarkable upper abdominal findings.   BONES: No acute osseous abnormality.        1. No acute cardiopulmonary process.     Signed by: Flynn Messer 10/13/2023 6:33 PM Dictation workstation:   [id number]             Results for orders placed or performed during the hospital encounter of 10/13/23 (from the past 24 hour(s))   Troponin I, High Sensitivity   Result Value Ref Range     Troponin I, High Sensitivity 25 (H) 0 - 20 ng/L   Magnesium   Result Value Ref Range     Magnesium 2.04 1.60 - 2.40 mg/dL   Comprehensive Metabolic Panel   Result Value Ref Range     Glucose 126 (H) 74 - 99 mg/dL     Sodium 135 (L) 136 - 145 mmol/L     Potassium 4.7 3.5 - 5.3 mmol/L     Chloride 99 98 - 107 mmol/L     Bicarbonate 25 21 - 32 mmol/L     Anion Gap 16 10 - 20 mmol/L     Urea Nitrogen 19 6 - 23 mg/dL     Creatinine 4.66 (H) 0.50 - 1.30 mg/dL     eGFR 14 (L) >60 mL/min/1.73m*2     Calcium 8.5 (L) 8.6 - 10.3 mg/dL     Albumin 4.0 3.4 - 5.0 g/dL     Alkaline Phosphatase 54 33 - 120 U/L     Total Protein 7.8 6.4 - 8.2 g/dL     AST 25 9 - 39 U/L     Bilirubin, Total 0.4 0.0 - 1.2 mg/dL     ALT 16 10 - 52 U/L   CBC and Auto Differential   Result Value Ref Range     WBC 8.6 4.4 - 11.3 x10*3/uL     nRBC 0.0 0.0 - 0.0 /100 WBCs     RBC 4.10 (L) 4.50 - 5.90 x10*6/uL     Hemoglobin 12.2 (L) 13.5 - 17.5 g/dL     Hematocrit 37.2 (L) 41.0 - 52.0 %     MCV 91 80 - 100 fL     MCH 29.8 26.0 - 34.0 pg     MCHC 32.8 32.0 - 36.0  g/dL     RDW 18.6 (H) 11.5 - 14.5 %     Platelets 102 (L) 150 - 450 x10*3/uL     MPV 10.8 7.5 - 11.5 fL     Neutrophils % 74.6 40.0 - 80.0 %     Immature Granulocytes %, Automated 0.7 0.0 - 0.9 %     Lymphocytes % 16.6 13.0 - 44.0 %     Monocytes % 4.7 2.0 - 10.0 %     Eosinophils % 2.9 0.0 - 6.0 %     Basophils % 0.5 0.0 - 2.0 %     Neutrophils Absolute 6.41 1.20 - 7.70 x10*3/uL     Immature Granulocytes Absolute, Automated 0.06 0.00 - 0.70 x10*3/uL     Lymphocytes Absolute 1.42 1.20 - 4.80 x10*3/uL     Monocytes Absolute 0.40 0.10 - 1.00 x10*3/uL     Eosinophils Absolute 0.25 0.00 - 0.70 x10*3/uL     Basophils Absolute 0.04 0.00 - 0.10 x10*3/uL   B-type natriuretic peptide   Result Value Ref Range     BNP 1,482 (H) 0 - 99 pg/mL   Urinalysis with Reflex Microscopic and Culture   Result Value Ref Range     Color, Urine Straw Straw, Yellow     Appearance, Urine Clear Clear     Specific Gravity, Urine 1.008 1.005 - 1.035     pH, Urine 9.0 (N) 5.0, 5.5, 6.0, 6.5, 7.0, 7.5, 8.0     Protein, Urine >=500 (3+) (N) NEGATIVE mg/dL     Glucose, Urine 150 (2+) (A) NEGATIVE mg/dL     Blood, Urine NEGATIVE NEGATIVE     Ketones, Urine NEGATIVE NEGATIVE mg/dL     Bilirubin, Urine NEGATIVE NEGATIVE     Urobilinogen, Urine <2.0 <2.0 mg/dL     Nitrite, Urine NEGATIVE NEGATIVE     Leukocyte Esterase, Urine TRACE (A) NEGATIVE   Extra Urine Gray Tube   Result Value Ref Range     Extra Tube Hold for add-ons.     Urinalysis Microscopic Only   Result Value Ref Range     WBC, Urine 11-20 (A) 1-5, NONE /HPF     RBC, Urine 3-5 NONE, 1-2, 3-5 /HPF            Assessment/Plan   Ponce is a 58-year-old male patient presenting to emergency department from hemodialysis today with complaint of elevated blood pressure.  Patient states he remained hypertensive throughout his whole dialysis session but was able to complete the session.  Patient medicated with multiple doses of hydralazine in ED, current blood pressure 182/87.  BNP 1482, troponin 25.   Labs consistent with ESRD.  Patient found to have a UTI and started on IV ceftriaxone.  Imaging showing bladder wall thickening but negative for acute process per radiology review.  Patient medicated for pain and admitted for further medical management.  Hypertensive urgency  Admit to telemetry observation per Dr. Gomez  See imaging results above  Hydralazine IV x2 in ED  Metoprolol IV x1 as needed  Telemetry monitoring  Resume home medications when med rec is complete  Morphine as needed/Zofran as needed  Trend troponin     2.  ESRD/HTN/hyponatremia/hyper kalemia/diabetes/testicular cancer     Renal diet  Continue home medications when med rec is complete     3.  DVT Ppx  SCDs  No chemical prophylaxis 2/2 recurrent hemoptysis  Activity as tolerated     I spent 20 minutes in the professional and overall care of this patient.        Brie Hameed, APRN-CNP  Patient fully evaluated and plan as above.              Revision History    Patient fully evaluated on October 15.  Continue to monitor blood pressure.  Correct electrolytes.  Appreciate urologic and nephrology consultations.            Enrique Gomez MD

## 2023-10-15 NOTE — SIGNIFICANT EVENT
0750: Pt. Was found on floor with blood surrounding his head while pt. Was laying on flood. Pt. Was conscious and stated that he tripped with his walker when trying to the to the restroom. Pt. Had pressure applied to his left forehead wound and as assisted back to bed.   0753: BP: 223/116 with heart rate of 84 and was 93% on room air.  MD responding to rapid response now present at bedside.  0755: BP: 226/112 with heart rate of 83  0757: MD ordered Apresoline 10mg IV once to be given, head CT, Q1 hour neuro check for 4 hours, and pt. Placed on bedrest ordered..   0800: Apresoline given  0803: BP: 213/106 with heart rate 81.    RN present when pt. Went to CT for head CT and RN accompanied pt. Back to room. RN continued to monitor.        1455: Pt. Was accompanied to CT for chest CT by PCA. PCA called RN via [place] and stated that pt. Was having a seizure.   1500: Rapid response called for pts. Seizure activity.  Upon arriving to the CT, RN found pt. Was unresponsive and was placed on 2 liters oxygen by CT staff.  1501: RN arrived at bedside. And MD responding to rapid present at bedside.   1510: /85 with heart rate 112, pulse ox 96% on 2 liters, glucose 122.  1511: Orders received for Lactate, CBC, CMP, ABG, repeat head CT, and order to transfer the pt to the 8th floor  1525: CT was completed at pt. Was transferred to 8th floor and RN gave bedside report.

## 2023-10-16 ENCOUNTER — APPOINTMENT (OUTPATIENT)
Dept: DIALYSIS | Facility: HOSPITAL | Age: 58
End: 2023-10-16
Payer: MEDICAID

## 2023-10-16 ENCOUNTER — APPOINTMENT (OUTPATIENT)
Dept: RADIOLOGY | Facility: HOSPITAL | Age: 58
End: 2023-10-16
Payer: MEDICAID

## 2023-10-16 ENCOUNTER — APPOINTMENT (OUTPATIENT)
Dept: NEUROLOGY | Facility: HOSPITAL | Age: 58
End: 2023-10-16
Payer: MEDICAID

## 2023-10-16 LAB
ALBUMIN SERPL BCP-MCNC: 3.6 G/DL (ref 3.4–5)
ALP SERPL-CCNC: 53 U/L (ref 33–120)
ALT SERPL W P-5'-P-CCNC: 11 U/L (ref 10–52)
AMPHETAMINES UR QL SCN: ABNORMAL
ANION GAP SERPL CALC-SCNC: 21 MMOL/L (ref 10–20)
AST SERPL W P-5'-P-CCNC: 14 U/L (ref 9–39)
BARBITURATES UR QL SCN: ABNORMAL
BENZODIAZ UR QL SCN: ABNORMAL
BILIRUB SERPL-MCNC: 0.4 MG/DL (ref 0–1.2)
BUN SERPL-MCNC: 60 MG/DL (ref 6–23)
BZE UR QL SCN: ABNORMAL
CALCIUM SERPL-MCNC: 8.5 MG/DL (ref 8.6–10.3)
CANNABINOIDS UR QL SCN: ABNORMAL
CHLORIDE SERPL-SCNC: 96 MMOL/L (ref 98–107)
CO2 SERPL-SCNC: 25 MMOL/L (ref 21–32)
CREAT SERPL-MCNC: 10.1 MG/DL (ref 0.5–1.3)
ERYTHROCYTE [DISTWIDTH] IN BLOOD BY AUTOMATED COUNT: 18.6 % (ref 11.5–14.5)
FENTANYL+NORFENTANYL UR QL SCN: ABNORMAL
GFR SERPL CREATININE-BSD FRML MDRD: 5 ML/MIN/1.73M*2
GLUCOSE SERPL-MCNC: 71 MG/DL (ref 74–99)
HCT VFR BLD AUTO: 35 % (ref 41–52)
HGB BLD-MCNC: 11.4 G/DL (ref 13.5–17.5)
MCH RBC QN AUTO: 29.6 PG (ref 26–34)
MCHC RBC AUTO-ENTMCNC: 32.6 G/DL (ref 32–36)
MCV RBC AUTO: 91 FL (ref 80–100)
NRBC BLD-RTO: 0 /100 WBCS (ref 0–0)
OPIATES UR QL SCN: ABNORMAL
OXYCODONE+OXYMORPHONE UR QL SCN: ABNORMAL
PCP UR QL SCN: ABNORMAL
PLATELET # BLD AUTO: 102 X10*3/UL (ref 150–450)
PMV BLD AUTO: 9.7 FL (ref 7.5–11.5)
POTASSIUM SERPL-SCNC: 4.8 MMOL/L (ref 3.5–5.3)
PROLACTIN SERPL-MCNC: 13.5 UG/L (ref 2–18)
PROT SERPL-MCNC: 6.8 G/DL (ref 6.4–8.2)
RBC # BLD AUTO: 3.85 X10*6/UL (ref 4.5–5.9)
SODIUM SERPL-SCNC: 137 MMOL/L (ref 136–145)
WBC # BLD AUTO: 10.2 X10*3/UL (ref 4.4–11.3)

## 2023-10-16 PROCEDURE — 95816 EEG AWAKE AND DROWSY: CPT

## 2023-10-16 PROCEDURE — G0378 HOSPITAL OBSERVATION PER HR: HCPCS

## 2023-10-16 PROCEDURE — 80307 DRUG TEST PRSMV CHEM ANLYZR: CPT

## 2023-10-16 PROCEDURE — 86704 HEP B CORE ANTIBODY TOTAL: CPT | Mod: CMCLAB,PARLAB | Performed by: NURSE PRACTITIONER

## 2023-10-16 PROCEDURE — 95816 EEG AWAKE AND DROWSY: CPT | Performed by: STUDENT IN AN ORGANIZED HEALTH CARE EDUCATION/TRAINING PROGRAM

## 2023-10-16 PROCEDURE — 96374 THER/PROPH/DIAG INJ IV PUSH: CPT | Mod: 59

## 2023-10-16 PROCEDURE — 2500000004 HC RX 250 GENERAL PHARMACY W/ HCPCS (ALT 636 FOR OP/ED): Performed by: INTERNAL MEDICINE

## 2023-10-16 PROCEDURE — 86706 HEP B SURFACE ANTIBODY: CPT | Mod: CMCLAB,PARLAB | Performed by: NURSE PRACTITIONER

## 2023-10-16 PROCEDURE — 99223 1ST HOSP IP/OBS HIGH 75: CPT

## 2023-10-16 PROCEDURE — 70551 MRI BRAIN STEM W/O DYE: CPT | Performed by: RADIOLOGY

## 2023-10-16 PROCEDURE — 2500000001 HC RX 250 WO HCPCS SELF ADMINISTERED DRUGS (ALT 637 FOR MEDICARE OP): Performed by: INTERNAL MEDICINE

## 2023-10-16 PROCEDURE — 87340 HEPATITIS B SURFACE AG IA: CPT | Mod: CMCLAB,PARLAB | Performed by: NURSE PRACTITIONER

## 2023-10-16 PROCEDURE — 96376 TX/PRO/DX INJ SAME DRUG ADON: CPT

## 2023-10-16 PROCEDURE — 70551 MRI BRAIN STEM W/O DYE: CPT

## 2023-10-16 PROCEDURE — 80053 COMPREHEN METABOLIC PANEL: CPT | Performed by: NURSE PRACTITIONER

## 2023-10-16 PROCEDURE — 76870 US EXAM SCROTUM: CPT

## 2023-10-16 PROCEDURE — 2550000001 HC RX 255 CONTRASTS: Performed by: INTERNAL MEDICINE

## 2023-10-16 PROCEDURE — 85027 COMPLETE CBC AUTOMATED: CPT | Performed by: NURSE PRACTITIONER

## 2023-10-16 PROCEDURE — 1200000002 HC GENERAL ROOM WITH TELEMETRY DAILY

## 2023-10-16 PROCEDURE — 71275 CT ANGIOGRAPHY CHEST: CPT

## 2023-10-16 PROCEDURE — 2500000004 HC RX 250 GENERAL PHARMACY W/ HCPCS (ALT 636 FOR OP/ED): Performed by: NURSE PRACTITIONER

## 2023-10-16 RX ORDER — LEVETIRACETAM 500 MG/1
500 TABLET ORAL 2 TIMES DAILY
Status: DISCONTINUED | OUTPATIENT
Start: 2023-10-16 | End: 2023-10-17

## 2023-10-16 RX ORDER — HEPARIN SODIUM 1000 [USP'U]/ML
2000 INJECTION, SOLUTION INTRAVENOUS; SUBCUTANEOUS
Status: DISCONTINUED | OUTPATIENT
Start: 2023-10-16 | End: 2023-10-20 | Stop reason: HOSPADM

## 2023-10-16 RX ADMIN — LEVETIRACETAM 500 MG: 500 TABLET, FILM COATED ORAL at 21:39

## 2023-10-16 RX ADMIN — HYDROXYZINE HYDROCHLORIDE 25 MG: 25 TABLET ORAL at 00:22

## 2023-10-16 RX ADMIN — CALCIUM ACETATE 1334 MG: 667 CAPSULE ORAL at 21:38

## 2023-10-16 RX ADMIN — HEPARIN SODIUM 2000 UNITS: 1000 INJECTION INTRAVENOUS; SUBCUTANEOUS at 12:05

## 2023-10-16 RX ADMIN — MIRTAZAPINE 7.5 MG: 15 TABLET, FILM COATED ORAL at 21:38

## 2023-10-16 RX ADMIN — SODIUM CHLORIDE 500 ML: 9 INJECTION, SOLUTION INTRAVENOUS at 12:25

## 2023-10-16 RX ADMIN — TRAMADOL HYDROCHLORIDE 50 MG: 50 TABLET, COATED ORAL at 12:37

## 2023-10-16 RX ADMIN — IOHEXOL 75 ML: 350 INJECTION, SOLUTION INTRAVENOUS at 14:03

## 2023-10-16 RX ADMIN — CALCIUM ACETATE 1334 MG: 667 CAPSULE ORAL at 15:59

## 2023-10-16 RX ADMIN — HYDRALAZINE HYDROCHLORIDE 10 MG: 20 INJECTION INTRAMUSCULAR; INTRAVENOUS at 13:00

## 2023-10-16 RX ADMIN — CARVEDILOL 25 MG: 25 TABLET, FILM COATED ORAL at 16:00

## 2023-10-16 RX ADMIN — HEPARIN SODIUM 2000 UNITS: 1000 INJECTION INTRAVENOUS; SUBCUTANEOUS at 09:05

## 2023-10-16 RX ADMIN — GUAIFENESIN 600 MG: 600 TABLET, EXTENDED RELEASE ORAL at 21:38

## 2023-10-16 RX ADMIN — HYDRALAZINE HYDROCHLORIDE 100 MG: 50 TABLET ORAL at 21:38

## 2023-10-16 RX ADMIN — MINOXIDIL 10 MG: 10 TABLET ORAL at 21:39

## 2023-10-16 RX ADMIN — PHENAZOPYRIDINE HYDROCHLORIDE 200 MG: 200 TABLET ORAL at 21:39

## 2023-10-16 RX ADMIN — HYDRALAZINE HYDROCHLORIDE 10 MG: 20 INJECTION INTRAMUSCULAR; INTRAVENOUS at 02:33

## 2023-10-16 RX ADMIN — ATORVASTATIN CALCIUM 40 MG: 40 TABLET, FILM COATED ORAL at 21:38

## 2023-10-16 RX ADMIN — LEVETIRACETAM 500 MG: 500 TABLET, FILM COATED ORAL at 12:27

## 2023-10-16 RX ADMIN — TRAMADOL HYDROCHLORIDE 50 MG: 50 TABLET, COATED ORAL at 21:44

## 2023-10-16 RX ADMIN — LEVETIRACETAM 500 MG: 500 TABLET, FILM COATED ORAL at 00:22

## 2023-10-16 RX ADMIN — GABAPENTIN 100 MG: 100 CAPSULE ORAL at 21:38

## 2023-10-16 ASSESSMENT — COGNITIVE AND FUNCTIONAL STATUS - GENERAL
HELP NEEDED FOR BATHING: A LITTLE
MOBILITY SCORE: 19
MOVING TO AND FROM BED TO CHAIR: A LITTLE
CLIMB 3 TO 5 STEPS WITH RAILING: A LOT
TOILETING: A LITTLE
MOBILITY SCORE: 19
WALKING IN HOSPITAL ROOM: A LITTLE
DRESSING REGULAR UPPER BODY CLOTHING: A LITTLE
DRESSING REGULAR LOWER BODY CLOTHING: A LITTLE
STANDING UP FROM CHAIR USING ARMS: A LITTLE
MOVING TO AND FROM BED TO CHAIR: A LITTLE
DRESSING REGULAR LOWER BODY CLOTHING: A LITTLE
CLIMB 3 TO 5 STEPS WITH RAILING: A LOT
DAILY ACTIVITIY SCORE: 22
WALKING IN HOSPITAL ROOM: A LITTLE
HELP NEEDED FOR BATHING: A LITTLE
STANDING UP FROM CHAIR USING ARMS: A LITTLE

## 2023-10-16 ASSESSMENT — ENCOUNTER SYMPTOMS
NUMBNESS: 0
TREMORS: 0
SPEECH DIFFICULTY: 0
BACK PAIN: 1
ABDOMINAL DISTENTION: 0
ENDOCRINE NEGATIVE: 1
DIAPHORESIS: 0
CHILLS: 0
APNEA: 0
SEIZURES: 0
HEMATOLOGIC/LYMPHATIC NEGATIVE: 1
EYE DISCHARGE: 0
ALLERGIC/IMMUNOLOGIC NEGATIVE: 1
PSYCHIATRIC NEGATIVE: 1
HEADACHES: 1
JOINT SWELLING: 1
DIZZINESS: 0
EYE ITCHING: 0
LIGHT-HEADEDNESS: 0
CHOKING: 0
ABDOMINAL PAIN: 0

## 2023-10-16 ASSESSMENT — PAIN SCALES - GENERAL
PAINLEVEL_OUTOF10: 9
PAINLEVEL_OUTOF10: 7
PAINLEVEL_OUTOF10: 5 - MODERATE PAIN
PAINLEVEL_OUTOF10: 4

## 2023-10-16 ASSESSMENT — PAIN - FUNCTIONAL ASSESSMENT: PAIN_FUNCTIONAL_ASSESSMENT: 0-10

## 2023-10-16 NOTE — NURSING NOTE
Report to Receiving RN:    Report From: Mamie  Time Report Called: 1213  Hand-Off Communication: SBA  Complications During Treatment: No  Ultrafiltration Treatment: No  Medications Administered During Dialysis: No  Blood Products Administered During Dialysis: No  Labs Sent During Dialysis: No  Heparin Drip Rate Changes: No    Electronic Signatures:  Heather Jack (Signed )   Authored: Heather Jack   (Signed)   Authored: Heather Jack    Last Updated: 12:17 PM by HEATHER JACK

## 2023-10-16 NOTE — PROGRESS NOTES
Ponce Do is a 58 y.o. male on day 0 of admission presenting with Hypertensive urgency.      Subjective   Patient with fall earlier.  Clinically unchanged.       Objective     Last Recorded Vitals  /88   Pulse 85   Temp 37.1 °C (98.8 °F) (Temporal)   Resp 18   Wt 58.1 kg (128 lb)   SpO2 96%   Intake/Output last 3 Shifts:    Intake/Output Summary (Last 24 hours) at 10/16/2023 1619  Last data filed at 10/16/2023 1205  Gross per 24 hour   Intake 600 ml   Output 100 ml   Net 500 ml       Admission Weight  Weight: 58.1 kg (128 lb) (10/13/23 1736)    Daily Weight  10/13/23 : 58.1 kg (128 lb)    Image Results  CT angio chest for pulmonary embolism  Narrative: Interpreted By:  Timothy Beckford,   STUDY:  CT ANGIO CHEST FOR PULMONARY EMBOLISM;  10/16/2023 2:02 pm      INDICATION:  Signs/Symptoms:r/o pe.      COMPARISON:  CT chest, abdomen and pelvis 08/01/2023      ACCESSION NUMBER(S):  [id number]      ORDERING CLINICIAN:  LUIS M HELLER      TECHNIQUE:  Helical data acquisition of the chest was obtained after  administration of 75ml IV Omnipaque 350. Images were reformatted in  coronal and sagittal planes. Axial and coronal MIP images were  created and reviewed.      FINDINGS:  POTENTIAL LIMITATIONS OF THE STUDY: None      HEART AND VESSELS:  No discrete filling defects within the main pulmonary artery or its  branches.      Main pulmonary artery and its branches are normal in caliber.      The thoracic aorta is of normal course and caliber without vascular  calcifications.      Severe coronary artery calcifications are seen.The study is not  optimized for evaluation of coronary arteries.      Mild-to-moderate cardiomegaly biatrial enlargement.      Trace pericardial effusion.      MEDIASTINUM AND ELIZABETH, LOWER NECK AND AXILLA:  The visualized thyroid gland is within normal limits.      [place] left-greater-than-right soft tissue hilar  thickening/adenopathy, similar to the chest CT from 08/01/2023.  No  mediastinal, axillary supraclavicular nodes.      Small hiatal hernia.      LUNGS AND AIRWAYS:  Central airways are patent without endobronchial lesions. Small  amount of retained secretions.Mild upper lobe predominant  centrilobular and paraseptal emphysema. No focal consolidation. No  pneumothorax. No pleural effusion. Mild bibasilar atelectasis. No  suspicious pulmonary nodule.      UPPER ABDOMEN:  The visualized subdiaphragmatic structures demonstrate no remarkable  findings.      CHEST WALL AND OSSEOUS STRUCTURES:  Right-sided double-lumen catheter with the tip terminating in the  right atrium. There are no suspicious osseous lesions. Multilevel  degenerative changes are present      Impression: 1.  No evidence of pulmonary embolism.  2. Mild upper lobe predominant centrilobular and paraseptal emphysema.  3. [place] left-greater-than-right hilar soft tissue  thickening/adenopathy, similar to the CT chest from 08/01/2023,  likely reactive. Recommend short-term follow-up to document  resolution..          Signed by: Timothy Beckford 10/16/2023 3:24 PM  Dictation workstation:   [id number]      Physical Exam    Relevant Results               Assessment/Plan   This patient currently has cardiac telemetry ordered; if you would like to modify or discontinue the telemetry order, click here to go to the orders activity to modify/discontinue the order.            Principal Problem:    Hypertensive urgency            Enrique Gomez MD  Physician  Internal Medicine     H&P      Addendum     Date of Service: 10/14/2023  5:59 AM     Addendum       Expand All Collapse All    History Of Present Illness  Ponce Do is a 58 y.o. male presenting to emergency department from dialysis for evaluation of hypertension.  Patient was reportedly at dialysis today and remained hypertensive throughout his session.  Patient states he was able to complete his full dialysis session.  Patient was sent into emergency department  for evaluation of hypertension.  Patient does report an increase in leg pain.  Patient denies chest pain or dizziness.  Patient denies recent illness, nausea, vomiting, diarrhea.     In ED, chest x-ray completed and negative for acute process per radiology review.  Bilateral lower extremity ultrasound completed and negative for acute process.  CT of the abdomen pelvis completed showing bladder wall thickening and testicular abnormalities concerning for malignancy consistent with patient's history of testicular cancer.  Hemoglobin 12.2, hematocrit 37.2, BNP 1482, troponin 25, glucose 126, sodium 135, GFR 14, creatinine 4.66.  Blood cultures and urine culture pending.  UTI positive.  Patient started on IV ceftriaxone.  Patient medicated with multiple doses of hydralazine.  Blood pressure 182/87, heart rate 82, respirations 18, temperature 36.8 °C, SPO2 98% on room air.  Patient admitted to telemetry observation under the care of Dr. Gomez who will continue to follow.  I was asked to do H&P and place initial admission orders.     Past Medical History  ESRD with hemodialysis [id number], testicular cancer, cerebral infarct with hemiplegia (right-sided), hyperkalemia, hypertension, hyponatremia, diabetes, recent pneumonia with recurrent hemoptysis     Surgical History  Bronchoscopy     Social History  He has no history on file for tobacco use, alcohol use, and drug use.     Family History  Reviewed and noncontributory     Allergies  NKDA/NKA     Review of Systems  A 10 point review of systems was completed and negative except what is listed in HPI  Physical Exam  Constitutional:       Appearance: Normal appearance.   HENT:      Mouth/Throat:      Mouth: Mucous membranes are moist.      Pharynx: Oropharynx is clear.   Eyes:      Pupils: Pupils are equal, round, and reactive to light.   Cardiovascular:      Rate and Rhythm: Normal rate and regular rhythm.   Pulmonary:      Effort: Pulmonary effort is normal.  "     Breath sounds: Normal breath sounds.   Abdominal:      General: Bowel sounds are normal.      Palpations: Abdomen is soft.   Musculoskeletal:         General: Normal range of motion.      Cervical back: Normal range of motion.   Skin:     General: Skin is warm and dry.      Capillary Refill: Capillary refill takes less than 2 seconds.   Neurological:      General: No focal deficit present.      Mental Status: He is alert and oriented to person, place, and time. Mental status is at baseline.   Psychiatric:         Mood and Affect: Mood normal.         Behavior: Behavior normal.            Last Recorded Vitals  Blood pressure (!) 182/87, pulse 82, temperature 37.1 °C (98.8 °F), resp. rate 18, height 1.702 m (5' 7\"), weight 58.1 kg (128 lb), SpO2 98 %.     Relevant Results  Vascular US lower extremity venous duplex bilateral     Result Date: 10/14/2023  Interpreted By:  Ponce Nunes, STUDY: [facility] US LOWER EXTREMITY VENOUS DUPLEX BILATERAL; 10/14/2023 1:25 am   INDICATION: Signs/Symptoms:pain.   COMPARISON: None.   ACCESSION NUMBER(S): [id number]   ORDERING CLINICIAN: MARC CROFT   TECHNIQUE: 2D grayscale and color-flow duplex images were obtained along with Doppler spectral waveform analysis of the deep venous system of the lower extremity from the groin to the knee. Attempts were made to image the calf veins. Venous compression and augmentation were performed.   FINDINGS: Right Lower Extremity: There is normal compressibility and flow within the visualized vessels of the deep venous system of this lower extremity.   Left Lower Extremity: There is normal compressibility and flow within the visualized vessels of the deep venous system of this lower extremity.          No evidence for deep venous thrombosis within the limits of this examination.   MACRO: None.   Signed by: Ponce Nunes 10/14/2023 1:43 AM Dictation workstation:   [id number]     CT pelvis wo IV contrast     Result Date: 10/14/2023  Interpreted By:  " Kavon Piedra, STUDY: CT PELVIS WO IV CONTRAST;  10/13/2023 11:50 pm   INDICATION: Signs/Symptoms:left groin pain. hx of testicular cancer.   COMPARISON: 8/1/2023   ACCESSION NUMBER(S): [id number]   ORDERING CLINICIAN: MARC CROFT   TECHNIQUE: Contiguous axial images of the pelvis were obtained without intravenous contrast. Coronal and sagittal reformatted images were obtained from the axial images.   FINDINGS: Limited evaluation the soft tissues secondary to lack of intravenous. Gallstones noted in the gallbladder. No evidence of bowel obstruction.   Urinary bladder is underdistended and not well evaluated. There is however prominent urinary bladder wall thickening and edema.   Limited evaluation of the scrotum.   No evidence acute displaced pelvic fracture. Vascular calcifications.        Urinary bladder wall thickening and edema compatible with cystitis; please clinically correlate with urinalysis.   No evidence of inguinal hernia.   Limited evaluation the scrotum and if there is concern for scrotal or testicular pathology, scrotal ultrasound is recommended for further evaluation.   MACRO: None   Signed by: Kavon Piedra 10/14/2023 12:41 AM Dictation workstation:   [id number]     XR chest 2 views     Result Date: 10/13/2023  Interpreted By:  Flynn Messer, STUDY: XR CHEST 2 VIEWS;  10/13/2023 5:58 pm   INDICATION: Signs/Symptoms:CP.   COMPARISON: Chest radiograph 08/13/2023   ACCESSION NUMBER(S): [id number]   ORDERING CLINICIAN: ROBIN MCKINLEY   FINDINGS: SUPPORT DEVICES: Right IJ central venous catheter terminates at the cavoatrial junction. Metallic device overlies the right heart, unchanged from prior exams, possibly reflecting a interatrial septal occlusion device.   CARDIOMEDIASTINAL SILHOUETTE: Cardiomediastinal silhouette is normal in size and configuration.   LUNGS: No pulmonary consolidation, pleural effusion or pneumothorax.   ABDOMEN: No remarkable upper abdominal findings.   BONES: No acute  osseous abnormality.        1. No acute cardiopulmonary process.     Signed by: Flynn Messer 10/13/2023 6:33 PM Dictation workstation:   [id number]             Results for orders placed or performed during the hospital encounter of 10/13/23 (from the past 24 hour(s))   Troponin I, High Sensitivity   Result Value Ref Range     Troponin I, High Sensitivity 25 (H) 0 - 20 ng/L   Magnesium   Result Value Ref Range     Magnesium 2.04 1.60 - 2.40 mg/dL   Comprehensive Metabolic Panel   Result Value Ref Range     Glucose 126 (H) 74 - 99 mg/dL     Sodium 135 (L) 136 - 145 mmol/L     Potassium 4.7 3.5 - 5.3 mmol/L     Chloride 99 98 - 107 mmol/L     Bicarbonate 25 21 - 32 mmol/L     Anion Gap 16 10 - 20 mmol/L     Urea Nitrogen 19 6 - 23 mg/dL     Creatinine 4.66 (H) 0.50 - 1.30 mg/dL     eGFR 14 (L) >60 mL/min/1.73m*2     Calcium 8.5 (L) 8.6 - 10.3 mg/dL     Albumin 4.0 3.4 - 5.0 g/dL     Alkaline Phosphatase 54 33 - 120 U/L     Total Protein 7.8 6.4 - 8.2 g/dL     AST 25 9 - 39 U/L     Bilirubin, Total 0.4 0.0 - 1.2 mg/dL     ALT 16 10 - 52 U/L   CBC and Auto Differential   Result Value Ref Range     WBC 8.6 4.4 - 11.3 x10*3/uL     nRBC 0.0 0.0 - 0.0 /100 WBCs     RBC 4.10 (L) 4.50 - 5.90 x10*6/uL     Hemoglobin 12.2 (L) 13.5 - 17.5 g/dL     Hematocrit 37.2 (L) 41.0 - 52.0 %     MCV 91 80 - 100 fL     MCH 29.8 26.0 - 34.0 pg     MCHC 32.8 32.0 - 36.0 g/dL     RDW 18.6 (H) 11.5 - 14.5 %     Platelets 102 (L) 150 - 450 x10*3/uL     MPV 10.8 7.5 - 11.5 fL     Neutrophils % 74.6 40.0 - 80.0 %     Immature Granulocytes %, Automated 0.7 0.0 - 0.9 %     Lymphocytes % 16.6 13.0 - 44.0 %     Monocytes % 4.7 2.0 - 10.0 %     Eosinophils % 2.9 0.0 - 6.0 %     Basophils % 0.5 0.0 - 2.0 %     Neutrophils Absolute 6.41 1.20 - 7.70 x10*3/uL     Immature Granulocytes Absolute, Automated 0.06 0.00 - 0.70 x10*3/uL     Lymphocytes Absolute 1.42 1.20 - 4.80 x10*3/uL     Monocytes Absolute 0.40 0.10 - 1.00 x10*3/uL     Eosinophils Absolute  0.25 0.00 - 0.70 x10*3/uL     Basophils Absolute 0.04 0.00 - 0.10 x10*3/uL   B-type natriuretic peptide   Result Value Ref Range     BNP 1,482 (H) 0 - 99 pg/mL   Urinalysis with Reflex Microscopic and Culture   Result Value Ref Range     Color, Urine Straw Straw, Yellow     Appearance, Urine Clear Clear     Specific Gravity, Urine 1.008 1.005 - 1.035     pH, Urine 9.0 (N) 5.0, 5.5, 6.0, 6.5, 7.0, 7.5, 8.0     Protein, Urine >=500 (3+) (N) NEGATIVE mg/dL     Glucose, Urine 150 (2+) (A) NEGATIVE mg/dL     Blood, Urine NEGATIVE NEGATIVE     Ketones, Urine NEGATIVE NEGATIVE mg/dL     Bilirubin, Urine NEGATIVE NEGATIVE     Urobilinogen, Urine <2.0 <2.0 mg/dL     Nitrite, Urine NEGATIVE NEGATIVE     Leukocyte Esterase, Urine TRACE (A) NEGATIVE   Extra Urine Gray Tube   Result Value Ref Range     Extra Tube Hold for add-ons.     Urinalysis Microscopic Only   Result Value Ref Range     WBC, Urine 11-20 (A) 1-5, NONE /HPF     RBC, Urine 3-5 NONE, 1-2, 3-5 /HPF            Assessment/Plan   Ponce is a 58-year-old male patient presenting to emergency department from hemodialysis today with complaint of elevated blood pressure.  Patient states he remained hypertensive throughout his whole dialysis session but was able to complete the session.  Patient medicated with multiple doses of hydralazine in ED, current blood pressure 182/87.  BNP 1482, troponin 25.  Labs consistent with ESRD.  Patient found to have a UTI and started on IV ceftriaxone.  Imaging showing bladder wall thickening but negative for acute process per radiology review.  Patient medicated for pain and admitted for further medical management.  Hypertensive urgency  Admit to telemetry observation per Dr. Gomez  See imaging results above  Hydralazine IV x2 in ED  Metoprolol IV x1 as needed  Telemetry monitoring  Resume home medications when med rec is complete  Morphine as needed/Zofran as needed  Trend troponin     2.  ESRD/HTN/hyponatremia/hyper  kalemia/diabetes/testicular cancer     Renal diet  Continue home medications when med rec is complete     3.  DVT Ppx  SCDs  No chemical prophylaxis 2/2 recurrent hemoptysis  Activity as tolerated     I spent 20 minutes in the professional and overall care of this patient.        SHINE Peacock-CNP  Patient fully evaluated and plan as above.              Revision History    Patient fully evaluated on October 15.  Continue to monitor blood pressure.  Correct electrolytes.  Appreciate urologic and nephrology consultations.     Patient fully evaluated on October 16.  Patient with questionable seizure activity and EEG is pending.  Patient started on Keppra.  Neurologic examination without focus today.  Dialyze patient and recheck labs in AM.       Enrique Gomez MD

## 2023-10-16 NOTE — PROGRESS NOTES
"Ponce Do is a 58 y.o. male on day 0 of admission presenting with Hypertensive urgency.    Subjective   F/U Hx of testicular CA/L groin/hip pain         Objective     Physical Exam  Alert and orient x3  Abdomen soft nontender  Last Recorded Vitals  Blood pressure 173/90, pulse 83, temperature 36.5 °C (97.7 °F), temperature source Temporal, resp. rate 18, height 1.702 m (5' 7.01\"), weight 58.1 kg (128 lb), SpO2 93 %.  Intake/Output last 3 Shifts:  I/O last 3 completed shifts:  In: - (0 mL/kg)   Out: 100 (1.7 mL/kg) [Urine:100 (0 mL/kg/hr)]  Dosing Weight: 58.1 kg     Relevant Results  Scheduled medications  amLODIPine, 10 mg, oral, Daily  atorvastatin, 40 mg, oral, Nightly  calcium acetate, 1,334 mg, oral, TID  carvedilol, 25 mg, oral, BID with meals  [Held by provider] clopidogrel, 75 mg, oral, Daily  [Held by provider] enoxaparin, 30 mg, subcutaneous, Daily  gabapentin, 100 mg, oral, Nightly  guaiFENesin, 600 mg, oral, BID  heparin, 2,000 Units, intravenous, After Dialysis  heparin, 2,000 Units, intra-catheter, After Dialysis  heparin, 2,000 Units, intra-catheter, After Dialysis  hydrALAZINE, 100 mg, oral, BID  levETIRAcetam, 500 mg, oral, BID  minoxidil, 10 mg, oral, BID  mirtazapine, 7.5 mg, oral, Nightly  phenazopyridine, 200 mg, oral, BID  sodium chloride, 500 mL, intravenous, Once      Continuous medications     PRN medications  PRN medications: hydrALAZINE, hydrOXYzine HCL, ipratropium-albuteroL, ondansetron, sodium chloride, traMADol    Results for orders placed or performed during the hospital encounter of 10/13/23 (from the past 24 hour(s))   POCT GLUCOSE   Result Value Ref Range    POCT Glucose 122 (H) 74 - 99 mg/dL   CBC   Result Value Ref Range    WBC 12.8 (H) 4.4 - 11.3 x10*3/uL    nRBC 0.0 0.0 - 0.0 /100 WBCs    RBC 4.25 (L) 4.50 - 5.90 x10*6/uL    Hemoglobin 12.6 (L) 13.5 - 17.5 g/dL    Hematocrit 39.2 (L) 41.0 - 52.0 %    MCV 92 80 - 100 fL    MCH 29.6 26.0 - 34.0 pg    MCHC 32.1 32.0 - 36.0 " g/dL    RDW 18.7 (H) 11.5 - 14.5 %    Platelets 118 (L) 150 - 450 x10*3/uL    MPV 9.5 7.5 - 11.5 fL   Comprehensive metabolic panel   Result Value Ref Range    Glucose 111 (H) 74 - 99 mg/dL    Sodium 136 136 - 145 mmol/L    Potassium 4.6 3.5 - 5.3 mmol/L    Chloride 94 (L) 98 - 107 mmol/L    Bicarbonate 21 21 - 32 mmol/L    Anion Gap 26 (H) 10 - 20 mmol/L    Urea Nitrogen 51 (H) 6 - 23 mg/dL    Creatinine 8.54 (H) 0.50 - 1.30 mg/dL    eGFR 7 (L) >60 mL/min/1.73m*2    Calcium 8.2 (L) 8.6 - 10.3 mg/dL    Albumin 3.8 3.4 - 5.0 g/dL    Alkaline Phosphatase 57 33 - 120 U/L    Total Protein 7.5 6.4 - 8.2 g/dL    AST 16 9 - 39 U/L    Bilirubin, Total 0.4 0.0 - 1.2 mg/dL    ALT 14 10 - 52 U/L   Prolactin   Result Value Ref Range    Prolactin 13.5 2.0 - 18.0 ug/L   Lactate   Result Value Ref Range    Lactate 6.6 (HH) 0.4 - 2.0 mmol/L   Blood Gas Arterial Full Panel Unsolicited   Result Value Ref Range    POCT pH, Arterial 7.28 (L) 7.38 - 7.42 pH    POCT pCO2, Arterial 48 (H) 38 - 42 mm Hg    POCT pO2, Arterial 82 (L) 85 - 95 mm Hg    POCT SO2, Arterial 96 94 - 100 %    POCT Oxy Hemoglobin, Arterial 92.5 (L) 94.0 - 98.0 %    POCT Hematocrit Calculated, Arterial 38.0 (L) 41.0 - 52.0 %    POCT Sodium, Arterial 132 (L) 136 - 145 mmol/L    POCT Potassium, Arterial 4.8 3.5 - 5.3 mmol/L    POCT Chloride, Arterial 95 (L) 98 - 107 mmol/L    POCT Ionized Calcium, Arterial 1.11 1.10 - 1.33 mmol/L    POCT Glucose, Arterial 121 (H) 74 - 99 mg/dL    POCT Lactate, Arterial 7.6 (HH) 0.4 - 2.0 mmol/L    POCT Base Excess, Arterial -4.4 (L) -2.0 - 3.0 mmol/L    POCT HCO3 Calculated, Arterial 22.6 22.0 - 26.0 mmol/L    POCT Hemoglobin, Arterial 12.8 (L) 13.5 - 17.5 g/dL    POCT Anion Gap, Arterial 19 10 - 25 mmo/L    Patient Temperature 37.0 degrees Celsius    FiO2 28 %    [employer] CANNULA     Critical Called By DAYDAY GUTIÉRREZ RRT     Critical Called To DR COLE     Critical Call Time 1525.0000     Critical Read Back Y    Light Blue Top    Result Value Ref Range    Extra Tube Hold for add-ons.    Green Top   Result Value Ref Range    Extra Tube Hold for add-ons.    Lavender Top   Result Value Ref Range    Extra Tube Hold for add-ons.    SST TOP   Result Value Ref Range    Extra Tube Hold for add-ons.    Lactate   Result Value Ref Range    Lactate 1.1 0.4 - 2.0 mmol/L   Drug Screen, Urine   Result Value Ref Range    Amphetamine Screen, Urine Presumptive Negative Presumptive Negative    Barbiturate Screen, Urine Presumptive Negative Presumptive Negative    Benzodiazepines Screen, Urine Presumptive Negative Presumptive Negative    Cannabinoid Screen, Urine Presumptive Negative Presumptive Negative    Cocaine Metabolite Screen, Urine Presumptive Negative Presumptive Negative    Fentanyl Screen, Urine Presumptive Negative Presumptive Negative    Opiate Screen, Urine Presumptive Positive (A) Presumptive Negative    Oxycodone Screen, Urine Presumptive Negative Presumptive Negative    PCP Screen, Urine Presumptive Negative Presumptive Negative   CBC   Result Value Ref Range    WBC 10.2 4.4 - 11.3 x10*3/uL    nRBC 0.0 0.0 - 0.0 /100 WBCs    RBC 3.85 (L) 4.50 - 5.90 x10*6/uL    Hemoglobin 11.4 (L) 13.5 - 17.5 g/dL    Hematocrit 35.0 (L) 41.0 - 52.0 %    MCV 91 80 - 100 fL    MCH 29.6 26.0 - 34.0 pg    MCHC 32.6 32.0 - 36.0 g/dL    RDW 18.6 (H) 11.5 - 14.5 %    Platelets 102 (L) 150 - 450 x10*3/uL    MPV 9.7 7.5 - 11.5 fL   Comprehensive Metabolic Panel   Result Value Ref Range    Glucose 71 (L) 74 - 99 mg/dL    Sodium 137 136 - 145 mmol/L    Potassium 4.8 3.5 - 5.3 mmol/L    Chloride 96 (L) 98 - 107 mmol/L    Bicarbonate 25 21 - 32 mmol/L    Anion Gap 21 (H) 10 - 20 mmol/L    Urea Nitrogen 60 (H) 6 - 23 mg/dL    Creatinine 10.10 (H) 0.50 - 1.30 mg/dL    eGFR 5 (L) >60 mL/min/1.73m*2    Calcium 8.5 (L) 8.6 - 10.3 mg/dL    Albumin 3.6 3.4 - 5.0 g/dL    Alkaline Phosphatase 53 33 - 120 U/L    Total Protein 6.8 6.4 - 8.2 g/dL    AST 14 9 - 39 U/L    Bilirubin,  Total 0.4 0.0 - 1.2 mg/dL    ALT 11 10 - 52 U/L       CT head wo IV contrast    Result Date: 10/15/2023  Interpreted By:  Angel Cristobal, STUDY: CT HEAD WO IV CONTRAST;  10/15/2023 3:28 pm   INDICATION: Signs/Symptoms:Seizure.   COMPARISON: 10/15/2023   ACCESSION NUMBER(S): [id number]   ORDERING CLINICIAN: ROBBY RAY   TECHNIQUE: Noncontrast axial CT scan of head was performed. Multiplanar reconstructions   FINDINGS: No acute intracranial hemorrhage, mass effect, midline shift, or herniation. No evidence of hydrocephalus. Mild periventricular and subcortical deep white matter hypodensity suggestive of chronic microangiopathic change. The ventricles and sulci are unremarkable for age. Redemonstrated right cerebellar encephalomalacia. Small right mastoid effusion. No acute osseous abnormality of the calvarium. No destructive bone lesion. Left forehead soft tissue swelling, increased. Atherosclerosis.       No acute intracranial abnormality. Consider follow-up with MRI as warranted.   Left forehead soft tissue swelling, increased.   Signed by: Angel Cristobal 10/15/2023 3:44 PM Dictation workstation:   [id number]    CT head wo IV contrast    Result Date: 10/15/2023  Interpreted By:  Annamarie Phan, STUDY: CT HEAD WO IV CONTRAST;  10/15/2023 8:12 am   INDICATION: Signs/Symptoms:fall.   COMPARISON: 07/11/2023   ACCESSION NUMBER(S): [id number]   ORDERING CLINICIAN: ROBBY RAY   TECHNIQUE: Examination was performed in the axial plane with sagittal and coronal reconstructions. Bone and soft tissue algorithms were performed.   FINDINGS: INTRACRANIAL: The ventricular system is symmetrical and nondilated. No mass or mass effect is identified. There is no hemorrhage or subdural fluid collection. There is no acute infarct. There is a fairly large remote right cerebellar hemispheric infarct. There is a small left frontal scalp hematoma. There is no underlying fracture of the calvarium.   EXTRACRANIAL: Visualized  paranasal sinuses and mastoids are clear.       1. No acute intracranial pathology. 2. Left frontal scalp hematoma. 3. Remote right cerebellar hemispheric infarct.   MACRO: None   Signed by: Annamarie Phan 10/15/2023 8:22 AM Dictation workstation:   [id number]    Vascular US lower extremity venous duplex bilateral    Result Date: 10/14/2023  Interpreted By:  Ponce Nunes, STUDY: VAS US LOWER EXTREMITY VENOUS DUPLEX BILATERAL; 10/14/2023 1:25 am   INDICATION: Signs/Symptoms:pain.   COMPARISON: None.   ACCESSION NUMBER(S): [id number]   ORDERING CLINICIAN: MARC CROFT   TECHNIQUE: 2D grayscale and color-flow duplex images were obtained along with Doppler spectral waveform analysis of the deep venous system of the lower extremity from the groin to the knee. Attempts were made to image the calf veins. Venous compression and augmentation were performed.   FINDINGS: Right Lower Extremity: There is normal compressibility and flow within the visualized vessels of the deep venous system of this lower extremity.   Left Lower Extremity: There is normal compressibility and flow within the visualized vessels of the deep venous system of this lower extremity.         No evidence for deep venous thrombosis within the limits of this examination.   MACRO: None.   Signed by: Ponce Nunes 10/14/2023 1:43 AM Dictation workstation:   [id number]    CT pelvis wo IV contrast    Result Date: 10/14/2023  Interpreted By:  Kavon Piedra, STUDY: CT PELVIS WO IV CONTRAST;  10/13/2023 11:50 pm   INDICATION: Signs/Symptoms:left groin pain. hx of testicular cancer.   COMPARISON: 8/1/2023   ACCESSION NUMBER(S): [id number]   ORDERING CLINICIAN: MARC CROFT   TECHNIQUE: Contiguous axial images of the pelvis were obtained without intravenous contrast. Coronal and sagittal reformatted images were obtained from the axial images.   FINDINGS: Limited evaluation the soft tissues secondary to lack of intravenous. Gallstones noted in the gallbladder.  No evidence of bowel obstruction.   Urinary bladder is underdistended and not well evaluated. There is however prominent urinary bladder wall thickening and edema.   Limited evaluation of the scrotum.   No evidence acute displaced pelvic fracture. Vascular calcifications.       Urinary bladder wall thickening and edema compatible with cystitis; please clinically correlate with urinalysis.   No evidence of inguinal hernia.   Limited evaluation the scrotum and if there is concern for scrotal or testicular pathology, scrotal ultrasound is recommended for further evaluation.   MACRO: None   Signed by: Kavon Piedra 10/14/2023 12:41 AM Dictation workstation:   [id number]    XR chest 2 views    Result Date: 10/13/2023  Interpreted By:  Flynn Messer, STUDY: XR CHEST 2 VIEWS;  10/13/2023 5:58 pm   INDICATION: Signs/Symptoms:CP.   COMPARISON: Chest radiograph 08/13/2023   ACCESSION NUMBER(S): [id number]   ORDERING CLINICIAN: ROBIN MCKINLEY   FINDINGS: SUPPORT DEVICES: Right IJ central venous catheter terminates at the cavoatrial junction. Metallic device overlies the right heart, unchanged from prior exams, possibly reflecting a interatrial septal occlusion device.   CARDIOMEDIASTINAL SILHOUETTE: Cardiomediastinal silhouette is normal in size and configuration.   LUNGS: No pulmonary consolidation, pleural effusion or pneumothorax.   ABDOMEN: No remarkable upper abdominal findings.   BONES: No acute osseous abnormality.       1. No acute cardiopulmonary process.     Signed by: Flynn Messer 10/13/2023 6:33 PM Dictation workstation:   [id number]           This patient currently has cardiac telemetry ordered; if you would like to modify or discontinue the telemetry order, click here to go to the orders activity to modify/discontinue the order.                 Assessment/Plan   Principal Problem:    Hypertensive urgency    Hx of testicular CA/L groin/hip pain  -Scrotal ultrasound is still pending                 Robin PERES  LILLIE Nunez

## 2023-10-16 NOTE — NURSING NOTE
Report from Sending RN:    Report From: Mamie  Recent Surgery of Procedure: No  Baseline Level of Consciousness (LOC): a/o x3  Oxygen Use: No  Type: N/A  Diabetic: No  Last BP Med Given Day of Dialysis: See MAR  Last Pain Med Given: See MAR  Lab Tests to be Obtained with Dialysis: No  Blood Transfusion to be Given During Dialysis: No  Available IV Access: Yes  Medications to be Administered During Dialysis: No  Continuous IV Infusion Running: No  Restraints on Currently or in the Last 24 Hours: No  Hand-Off Communication: Full code; stable for hd; right cvc

## 2023-10-16 NOTE — PROGRESS NOTES
10/16/23 1306   Discharge Planning   Home or Post Acute Services Post acute facilities (Rehab/SNF/etc)   Type of Post Acute Facility Services Skilled nursing   Patient expects to be discharged to: [facility]   Does the patient need discharge transport arranged? Yes   RoundTrip coordination needed? Yes     1307:  Need onsite dialysis set up and precert    1404:   Notified by [facility] patient now want [facility].  Referral submitted for review     1441:  [facility] can accept.  Verified with patient [facility] facility preference.  Will need onsite dialysis set up at facility and precert.

## 2023-10-16 NOTE — PROGRESS NOTES
NEPHROLOGY PROGRESS NOTE    REASON FOR CONSULT: ESRD on HD MWF    SUBJECTIVE:    Patient seen on dialysis.  He denies any chest pain or shortness of breath.  States that he had a stroke.  He came from [facility].    OBJECTIVE:    Visit Vitals  /90   Pulse 83   Temp 36.5 °C (97.7 °F) (Temporal)   Resp 18          Intake/Output Summary (Last 24 hours) at 10/16/2023 1123  Last data filed at 10/16/2023 1105  Gross per 24 hour   Intake 200 ml   Output 100 ml   Net 100 ml        General: Awake and Alert, In no distress, Cooperative  HEENT: Oral mucosa moist, EOMI, bruise over the left forehead  NECK: Supple, right IJ TDC noted  CHEST: No crackles, no wheeze, no tachypnea  CVS: S1,S2 heard, no rubs, RRR  ABD: Soft, Non Tender, BS present  EXT: no edema    MEDICATION:    Scheduled medications  amLODIPine, 10 mg, oral, Daily  atorvastatin, 40 mg, oral, Nightly  calcium acetate, 1,334 mg, oral, TID  carvedilol, 25 mg, oral, BID with meals  [Held by provider] clopidogrel, 75 mg, oral, Daily  [Held by provider] enoxaparin, 30 mg, subcutaneous, Daily  gabapentin, 100 mg, oral, Nightly  guaiFENesin, 600 mg, oral, BID  heparin, 2,000 Units, intravenous, After Dialysis  heparin, 2,000 Units, intra-catheter, After Dialysis  heparin, 2,000 Units, intra-catheter, After Dialysis  hydrALAZINE, 100 mg, oral, BID  levETIRAcetam, 500 mg, oral, BID  minoxidil, 10 mg, oral, BID  mirtazapine, 7.5 mg, oral, Nightly  phenazopyridine, 200 mg, oral, BID  sodium chloride, 500 mL, intravenous, Once      Continuous medications     PRN medications  PRN medications: hydrALAZINE, hydrOXYzine HCL, ipratropium-albuteroL, ondansetron, sodium chloride, traMADol     RESULTS:    Lab Results   Component Value Date    WBC 10.2 10/16/2023    HGB 11.4 (L) 10/16/2023    HCT 35.0 (L) 10/16/2023    MCV 91 10/16/2023     (L) 10/16/2023        Lab Results   Component Value Date    CREATININE 10.10 (H) 10/16/2023    BUN 60 (H)  10/16/2023     10/16/2023    K 4.8 10/16/2023    CL 96 (L) 10/16/2023    CO2 25 10/16/2023        Radiology Imaging reviewed      ASSESSMENT/PLAN:     1.  ESRD on HD MWF: Patient seen on dialysis.  He is tolerating dialysis well.  He states that he had a stroke.  He came from the nursing facility.  We are attempting 2.5 L fluid removal.  Using a 2K bath.    2.  Hyperphosphatemia: He is on calcium acetate with meals.    3.  Hypertension: He is on amlodipine Coreg and hydralazine        Thank You very much for allowing me to participate in the care of this Patient    This document was created using dragon dictation and may contain unintended error    Deni Hamm MD   10/16/23

## 2023-10-16 NOTE — CONSULTS
Inpatient consult to Neurology  Consult performed by: Park Solano DO  Consult ordered by: Enrique Gomez MD  Reason for consult: seizure        Reason For Consult  Seizure     History Of Present Illness  Ponce Do is a 58 y.o. male ESRD on hemodialysis Monday Wednesday Friday, cerebellar infarct with right-sided residual hemiplegia, hyperkalemia, hypertension, hyponatremia, diabetes, pneumonia, testicular cancer presenting to [facility] on 10/14 for evaluation of hypertension during dialysis.  Upon arrival to the ED his blood pressure was in the 180s.  Rapid response was called yesterday morning at approximately 8 AM as the patient was found down in the floor with a pool of blood, he had an unwitnessed fall.  His blood pressure was 220 systolic at that time, he was given 10 mg of hydralazine.  His blood thinners were held.  He denies loss of consciousness.  Patient had a stat head CT which showed the scalp hematoma but no intracranial bleeding.  Patient then had a repeat rapid response called at approximately 3 PM yesterday.  The patient was being transported to radiology for CTA due to concerns for possible PE.  Patient was noted to be displaying seizure-like activity, rhythmically moving all 4 extremities and thrashing on the bed.  When the rapid response team arrived the patient was obtunded in the bed with no response to noxious stimuli.  His vital signs were stable, he was not hypoglycemic.  During the response team the patient did become more alert and did not display additional seizure-like activity.  CT head at this time was negative for acute intracranial hemorrhage.  ABG was done which demonstrated acidosis and elevated lactate up to 7.6.  Patient was given 1 mg of Ativan and loaded with 1000 mg of Keppra.  He was also started on 500 mg of Keppra twice per day.  EEG was ordered at that time.  Neurology was subsequently consulted.    Patient currently denies any numbness, tingling, any additional  weakness besides his baseline RUE and RLE weakness.  He is left-handed.  He does have residual right upper and lower extremity weakness secondary to his prior stroke.  He denies any history of prior seizures.  He is a poor historian.     Past Medical History  ESRD with hemodialysis [facility], testicular cancer, cerebral infarct with hemiplegia (right-sided), hyperkalemia, hypertension, hyponatremia, diabetes, recent pneumonia with recurrent hemoptysis     Surgical History  Bronchoscopy      Social History  Smokes 1 pack/day for past 20 years, denies illicit drug use denies alcohol use.  He is homeless.    Family History  Patient denies any family history      Allergies  Patient has no known allergies.    Review of Systems   Constitutional:  Negative for chills and diaphoresis.   HENT:  Negative for congestion and tinnitus.    Eyes:  Negative for discharge and itching.   Respiratory:  Negative for apnea and choking.    Cardiovascular:  Negative for chest pain and leg swelling.   Gastrointestinal:  Negative for abdominal distention and abdominal pain.   Endocrine: Negative.    Genitourinary: Negative.    Musculoskeletal:  Positive for back pain and joint swelling.   Skin:         Scalp hematoma, LUE bruising    Allergic/Immunologic: Negative.    Neurological:  Positive for headaches. Negative for dizziness, tremors, seizures, speech difficulty, light-headedness and numbness.   Hematological: Negative.    Psychiatric/Behavioral: Negative.          Physical Exam  Constitutional:       General: He is not in acute distress.     Appearance: He is not toxic-appearing.   HENT:      Head:      Comments: Scalp hematoma     Nose: Nose normal.   Eyes:      General: No visual field deficit.     Extraocular Movements: Extraocular movements intact.      Pupils: Pupils are equal, round, and reactive to light.   Cardiovascular:      Rate and Rhythm: Normal rate and regular rhythm.      Heart sounds: No murmur  heard.  Pulmonary:      Effort: No respiratory distress.   Chest:      Chest wall: No tenderness.   Abdominal:      General: Abdomen is flat.      Palpations: Abdomen is soft.   Musculoskeletal:      Cervical back: No rigidity or tenderness.      Comments: Lower back pain, LUE pain around shoulder   Skin:     General: Skin is warm and dry.      Findings: Bruising present.   Neurological:      General: No focal deficit present.      Mental Status: He is alert and oriented to person, place, and time.      Cranial Nerves: Cranial nerves 2-12 are intact. No cranial nerve deficit or facial asymmetry.      Motor: Weakness (RUE and RLE weakness, chronic) present.      Coordination: Finger-Nose-Finger Test abnormal. Heel to Shin Test normal.   Psychiatric:         Mood and Affect: Mood normal.         Behavior: Behavior normal.          Last Recorded Vitals  /90   Pulse 79   Temp 36.3 °C (97.3 °F)   Resp 18   Wt 58.1 kg (128 lb)   SpO2 93%     Relevant Results    CT head wo IV contrast    Result Date: 10/15/2023  Interpreted By:  Angel Cristobal, STUDY: CT HEAD WO IV CONTRAST;  10/15/2023 3:28 pm   INDICATION: Signs/Symptoms:Seizure.   COMPARISON: 10/15/2023   ACCESSION NUMBER(S): [id number]   ORDERING CLINICIAN: ROBBY RAY   TECHNIQUE: Noncontrast axial CT scan of head was performed. Multiplanar reconstructions   FINDINGS: No acute intracranial hemorrhage, mass effect, midline shift, or herniation. No evidence of hydrocephalus. Mild periventricular and subcortical deep white matter hypodensity suggestive of chronic microangiopathic change. The ventricles and sulci are unremarkable for age. Redemonstrated right cerebellar encephalomalacia. Small right mastoid effusion. No acute osseous abnormality of the calvarium. No destructive bone lesion. Left forehead soft tissue swelling, increased. Atherosclerosis.       No acute intracranial abnormality. Consider follow-up with MRI as warranted.   Left forehead soft  tissue swelling, increased.   Signed by: Angel Cristobal 10/15/2023 3:44 PM Dictation workstation:   [id number]    CT head wo IV contrast    Result Date: 10/15/2023  Interpreted By:  Annamarie Phan, STUDY: CT HEAD WO IV CONTRAST;  10/15/2023 8:12 am   INDICATION: Signs/Symptoms:fall.   COMPARISON: 07/11/2023   ACCESSION NUMBER(S): [id number]   ORDERING CLINICIAN: ROBBY RAY   TECHNIQUE: Examination was performed in the axial plane with sagittal and coronal reconstructions. Bone and soft tissue algorithms were performed.   FINDINGS: INTRACRANIAL: The ventricular system is symmetrical and nondilated. No mass or mass effect is identified. There is no hemorrhage or subdural fluid collection. There is no acute infarct. There is a fairly large remote right cerebellar hemispheric infarct. There is a small left frontal scalp hematoma. There is no underlying fracture of the calvarium.   EXTRACRANIAL: Visualized paranasal sinuses and mastoids are clear.       1. No acute intracranial pathology. 2. Left frontal scalp hematoma. 3. Remote right cerebellar hemispheric infarct.   MACRO: None   Signed by: Annamarie Phan 10/15/2023 8:22 AM Dictation workstation:   [id number]    Vascular US lower extremity venous duplex bilateral    Result Date: 10/14/2023  Interpreted By:  Ponce Nunes, STUDY: [facility] US LOWER EXTREMITY VENOUS DUPLEX BILATERAL; 10/14/2023 1:25 am   INDICATION: Signs/Symptoms:pain.   COMPARISON: None.   ACCESSION NUMBER(S): [id number]   ORDERING CLINICIAN: MARC CROFT   TECHNIQUE: 2D grayscale and color-flow duplex images were obtained along with Doppler spectral waveform analysis of the deep venous system of the lower extremity from the groin to the knee. Attempts were made to image the calf veins. Venous compression and augmentation were performed.   FINDINGS: Right Lower Extremity: There is normal compressibility and flow within the visualized vessels of the deep venous system of this lower extremity.    Left Lower Extremity: There is normal compressibility and flow within the visualized vessels of the deep venous system of this lower extremity.         No evidence for deep venous thrombosis within the limits of this examination.   MACRO: None.   Signed by: Ponce Nunes 10/14/2023 1:43 AM Dictation workstation:   [id number]    CT pelvis wo IV contrast    Result Date: 10/14/2023  Interpreted By:  Kavon Peidra, STUDY: CT PELVIS WO IV CONTRAST;  10/13/2023 11:50 pm   INDICATION: Signs/Symptoms:left groin pain. hx of testicular cancer.   COMPARISON: 8/1/2023   ACCESSION NUMBER(S): [id number]   ORDERING CLINICIAN: MARC CROFT   TECHNIQUE: Contiguous axial images of the pelvis were obtained without intravenous contrast. Coronal and sagittal reformatted images were obtained from the axial images.   FINDINGS: Limited evaluation the soft tissues secondary to lack of intravenous. Gallstones noted in the gallbladder. No evidence of bowel obstruction.   Urinary bladder is underdistended and not well evaluated. There is however prominent urinary bladder wall thickening and edema.   Limited evaluation of the scrotum.   No evidence acute displaced pelvic fracture. Vascular calcifications.       Urinary bladder wall thickening and edema compatible with cystitis; please clinically correlate with urinalysis.   No evidence of inguinal hernia.   Limited evaluation the scrotum and if there is concern for scrotal or testicular pathology, scrotal ultrasound is recommended for further evaluation.   MACRO: None   Signed by: Kavon Piedra 10/14/2023 12:41 AM Dictation workstation:   [id number]    XR chest 2 views    Result Date: 10/13/2023  Interpreted By:  Flynn Messer, STUDY: XR CHEST 2 VIEWS;  10/13/2023 5:58 pm   INDICATION: Signs/Symptoms:CP.   COMPARISON: Chest radiograph 08/13/2023   ACCESSION NUMBER(S): [id number]   ORDERING CLINICIAN: ROBIN MCKINLEY   FINDINGS: SUPPORT DEVICES: Right IJ central venous catheter terminates at  the cavoatrial junction. Metallic device overlies the right heart, unchanged from prior exams, possibly reflecting a interatrial septal occlusion device.   CARDIOMEDIASTINAL SILHOUETTE: Cardiomediastinal silhouette is normal in size and configuration.   LUNGS: No pulmonary consolidation, pleural effusion or pneumothorax.   ABDOMEN: No remarkable upper abdominal findings.   BONES: No acute osseous abnormality.       1. No acute cardiopulmonary process.     Signed by: Flynn Messer 10/13/2023 6:33 PM Dictation workstation:   [id number]       Assessment/Plan     Ponce Do is a 58 y.o. male ESRD on hemodialysis Monday Wednesday Friday, cerebellar infarct with right-sided residual hemiplegia, hyperkalemia, hypertension, hyponatremia, diabetes, pneumonia, testicular cancer presenting to [facility] on 10/14 for evaluation of hypertension during dialysis. Patient had 2 rapid responses called as he was having seizure like activity.     #Seizure-like activity  #End-stage renal disease on hemodialysis  #Tobacco abuse  #Hx testicular cancer  #Hypertension     -Continue Keppra 500 mg twice per day, we will continue to follow to assess need for antiepileptic medications  -Agree with EEG  -Patient will need MRI of the brain without contrast, this has been ordered  -seizure precautions  -treat underlying metabolic abnormalities    Pablo Nicole DO

## 2023-10-17 PROBLEM — I69.351 HEMIPLEGIA AND HEMIPARESIS FOLLOWING CEREBRAL INFARCTION AFFECTING RIGHT DOMINANT SIDE (MULTI): Status: ACTIVE | Noted: 2023-09-02

## 2023-10-17 PROBLEM — N18.9 HYPOCALCEMIA DUE TO CHRONIC KIDNEY DISEASE: Status: ACTIVE | Noted: 2023-06-06

## 2023-10-17 PROBLEM — E87.1 HYPONATREMIA: Status: ACTIVE | Noted: 2023-06-06

## 2023-10-17 PROBLEM — E83.51 HYPOCALCEMIA DUE TO CHRONIC KIDNEY DISEASE: Status: ACTIVE | Noted: 2023-06-06

## 2023-10-17 PROBLEM — B02.9 HERPES ZOSTER WITHOUT COMPLICATION: Status: ACTIVE | Noted: 2023-07-10

## 2023-10-17 PROBLEM — R54 SENILE ASTHENIA: Status: ACTIVE | Noted: 2023-03-02

## 2023-10-17 PROBLEM — D63.1 ANEMIA DUE TO CHRONIC KIDNEY DISEASE, ON CHRONIC DIALYSIS (MULTI): Status: ACTIVE | Noted: 2023-06-06

## 2023-10-17 PROBLEM — Z99.2 ANEMIA DUE TO CHRONIC KIDNEY DISEASE, ON CHRONIC DIALYSIS (MULTI): Status: ACTIVE | Noted: 2023-06-06

## 2023-10-17 PROBLEM — Z28.310 UNVACCINATED FOR COVID-19: Status: ACTIVE | Noted: 2023-09-02

## 2023-10-17 PROBLEM — N18.9 HYPERPHOSPHATEMIA DUE TO CHRONIC KIDNEY DISEASE: Status: ACTIVE | Noted: 2023-06-06

## 2023-10-17 PROBLEM — Z99.2 ESRD (END STAGE RENAL DISEASE) ON DIALYSIS (MULTI): Status: ACTIVE | Noted: 2023-06-06

## 2023-10-17 PROBLEM — F32.1 MAJOR DEPRESSIVE DISORDER, SINGLE EPISODE, MODERATE (MULTI): Chronic | Status: ACTIVE | Noted: 2023-06-01

## 2023-10-17 PROBLEM — I10 HYPERTENSION: Status: ACTIVE | Noted: 2023-06-06

## 2023-10-17 PROBLEM — G92.9 TOXIC ENCEPHALOPATHY: Status: ACTIVE | Noted: 2023-07-20

## 2023-10-17 PROBLEM — E83.39 HYPERPHOSPHATEMIA DUE TO CHRONIC KIDNEY DISEASE: Status: ACTIVE | Noted: 2023-06-06

## 2023-10-17 PROBLEM — R26.2 DIFFICULTY IN WALKING, NOT ELSEWHERE CLASSIFIED: Status: ACTIVE | Noted: 2023-09-02

## 2023-10-17 PROBLEM — Z86.73 HX OF ARTERIAL ISCHEMIC STROKE: Status: ACTIVE | Noted: 2023-06-05

## 2023-10-17 PROBLEM — N18.6 ANEMIA DUE TO CHRONIC KIDNEY DISEASE, ON CHRONIC DIALYSIS (MULTI): Status: ACTIVE | Noted: 2023-06-06

## 2023-10-17 PROBLEM — M54.50 LOW BACK PAIN: Status: ACTIVE | Noted: 2023-04-04

## 2023-10-17 PROBLEM — S20.219A CONTUSION OF RIB: Status: ACTIVE | Noted: 2023-08-03

## 2023-10-17 PROBLEM — R07.81 PLEURODYNIA: Status: ACTIVE | Noted: 2023-09-02

## 2023-10-17 PROBLEM — E16.2 HYPOGLYCEMIA: Status: ACTIVE | Noted: 2023-10-17

## 2023-10-17 PROBLEM — F33.1 MODERATE EPISODE OF RECURRENT MAJOR DEPRESSIVE DISORDER (MULTI): Status: ACTIVE | Noted: 2023-06-06

## 2023-10-17 PROBLEM — I16.0 HYPERTENSIVE URGENCY: Status: ACTIVE | Noted: 2023-06-06

## 2023-10-17 PROBLEM — Z85.028 HISTORY OF STOMACH CANCER: Status: ACTIVE | Noted: 2023-08-25

## 2023-10-17 PROBLEM — K92.0 HEMATEMESIS: Status: ACTIVE | Noted: 2023-08-25

## 2023-10-17 PROBLEM — R60.9 EDEMA: Status: ACTIVE | Noted: 2023-01-23

## 2023-10-17 PROBLEM — J18.9 PNA (PNEUMONIA): Status: ACTIVE | Noted: 2023-08-19

## 2023-10-17 PROBLEM — E87.5 HYPERKALEMIA: Status: ACTIVE | Noted: 2023-01-26

## 2023-10-17 PROBLEM — Z99.2 DEPENDENCE ON RENAL DIALYSIS (CMS-HCC): Status: ACTIVE | Noted: 2023-09-02

## 2023-10-17 PROBLEM — I12.0 HYPERTENSIVE CHRONIC KIDNEY DISEASE WITH STAGE 5 CHRONIC KIDNEY DISEASE OR END STAGE RENAL DISEASE (MULTI): Status: ACTIVE | Noted: 2023-09-02

## 2023-10-17 PROBLEM — R04.2 HEMOPTYSIS: Status: ACTIVE | Noted: 2023-08-19

## 2023-10-17 PROBLEM — N25.81 SECONDARY HYPERPARATHYROIDISM OF RENAL ORIGIN (MULTI): Status: ACTIVE | Noted: 2023-06-06

## 2023-10-17 PROBLEM — E43 SEVERE PROTEIN-CALORIE MALNUTRITION (MULTI): Status: ACTIVE | Noted: 2023-06-06

## 2023-10-17 PROBLEM — R41.82 ALTERED MENTAL STATUS: Status: ACTIVE | Noted: 2023-10-17

## 2023-10-17 PROBLEM — M79.89 SWELLING OF RIGHT FOOT: Status: ACTIVE | Noted: 2023-10-17

## 2023-10-17 PROBLEM — N18.6 ESRD (END STAGE RENAL DISEASE) ON DIALYSIS (MULTI): Status: ACTIVE | Noted: 2023-06-06

## 2023-10-17 PROBLEM — Q21.12 PATENT FORAMEN OVALE (HHS-HCC): Status: ACTIVE | Noted: 2023-07-20

## 2023-10-17 PROBLEM — F17.200 NICOTINE USE DISORDER: Status: ACTIVE | Noted: 2023-03-02

## 2023-10-17 LAB
ALBUMIN SERPL BCP-MCNC: 3.3 G/DL (ref 3.4–5)
ALP SERPL-CCNC: 52 U/L (ref 33–120)
ALT SERPL W P-5'-P-CCNC: 11 U/L (ref 10–52)
ANION GAP SERPL CALC-SCNC: 14 MMOL/L (ref 10–20)
AST SERPL W P-5'-P-CCNC: 13 U/L (ref 9–39)
BILIRUB SERPL-MCNC: 0.4 MG/DL (ref 0–1.2)
BUN SERPL-MCNC: 32 MG/DL (ref 6–23)
CALCIUM SERPL-MCNC: 8.1 MG/DL (ref 8.6–10.3)
CHLORIDE SERPL-SCNC: 100 MMOL/L (ref 98–107)
CO2 SERPL-SCNC: 26 MMOL/L (ref 21–32)
CREAT SERPL-MCNC: 6.82 MG/DL (ref 0.5–1.3)
ERYTHROCYTE [DISTWIDTH] IN BLOOD BY AUTOMATED COUNT: 18.9 % (ref 11.5–14.5)
GFR SERPL CREATININE-BSD FRML MDRD: 9 ML/MIN/1.73M*2
GLUCOSE SERPL-MCNC: 81 MG/DL (ref 74–99)
HBV CORE AB SER QL: NONREACTIVE
HBV SURFACE AB SER-ACNC: 13.9 MIU/ML
HBV SURFACE AG SERPL QL IA: NONREACTIVE
HCT VFR BLD AUTO: 32.1 % (ref 41–52)
HGB BLD-MCNC: 10.4 G/DL (ref 13.5–17.5)
MCH RBC QN AUTO: 29.5 PG (ref 26–34)
MCHC RBC AUTO-ENTMCNC: 32.4 G/DL (ref 32–36)
MCV RBC AUTO: 91 FL (ref 80–100)
NRBC BLD-RTO: 0 /100 WBCS (ref 0–0)
PLATELET # BLD AUTO: 98 X10*3/UL (ref 150–450)
PMV BLD AUTO: 10.1 FL (ref 7.5–11.5)
POTASSIUM SERPL-SCNC: 4.4 MMOL/L (ref 3.5–5.3)
PROT SERPL-MCNC: 6.4 G/DL (ref 6.4–8.2)
PSA FREE MFR SERPL: 38 %
PSA FREE SERPL-MCNC: 0.3 NG/ML
PSA SERPL IA-MCNC: 0.8 NG/ML (ref 0–4)
RBC # BLD AUTO: 3.52 X10*6/UL (ref 4.5–5.9)
SODIUM SERPL-SCNC: 136 MMOL/L (ref 136–145)
WBC # BLD AUTO: 8.2 X10*3/UL (ref 4.4–11.3)

## 2023-10-17 PROCEDURE — 2500000001 HC RX 250 WO HCPCS SELF ADMINISTERED DRUGS (ALT 637 FOR MEDICARE OP): Performed by: INTERNAL MEDICINE

## 2023-10-17 PROCEDURE — 97165 OT EVAL LOW COMPLEX 30 MIN: CPT | Mod: GO

## 2023-10-17 PROCEDURE — 84075 ASSAY ALKALINE PHOSPHATASE: CPT | Performed by: NURSE PRACTITIONER

## 2023-10-17 PROCEDURE — 96376 TX/PRO/DX INJ SAME DRUG ADON: CPT

## 2023-10-17 PROCEDURE — 1200000002 HC GENERAL ROOM WITH TELEMETRY DAILY

## 2023-10-17 PROCEDURE — 85027 COMPLETE CBC AUTOMATED: CPT | Performed by: NURSE PRACTITIONER

## 2023-10-17 PROCEDURE — 2500000004 HC RX 250 GENERAL PHARMACY W/ HCPCS (ALT 636 FOR OP/ED): Performed by: NURSE PRACTITIONER

## 2023-10-17 PROCEDURE — 2500000004 HC RX 250 GENERAL PHARMACY W/ HCPCS (ALT 636 FOR OP/ED): Performed by: INTERNAL MEDICINE

## 2023-10-17 PROCEDURE — 97161 PT EVAL LOW COMPLEX 20 MIN: CPT | Mod: GP

## 2023-10-17 PROCEDURE — G0378 HOSPITAL OBSERVATION PER HR: HCPCS

## 2023-10-17 PROCEDURE — 99232 SBSQ HOSP IP/OBS MODERATE 35: CPT | Performed by: NURSE PRACTITIONER

## 2023-10-17 RX ORDER — LEVETIRACETAM 500 MG/1
500 TABLET ORAL 2 TIMES DAILY
Qty: 60 TABLET | Refills: 0 | Status: SHIPPED | OUTPATIENT
Start: 2023-10-17

## 2023-10-17 RX ORDER — IPRATROPIUM BROMIDE AND ALBUTEROL SULFATE 2.5; .5 MG/3ML; MG/3ML
3 SOLUTION RESPIRATORY (INHALATION) EVERY 2 HOUR PRN
Qty: 180 ML | Refills: 11 | Status: SHIPPED | OUTPATIENT
Start: 2023-10-17

## 2023-10-17 RX ORDER — CARVEDILOL 25 MG/1
25 TABLET ORAL
Qty: 60 TABLET | Refills: 0 | Status: SHIPPED | OUTPATIENT
Start: 2023-10-17 | End: 2023-11-16

## 2023-10-17 RX ORDER — HYDROXYZINE HYDROCHLORIDE 25 MG/1
25 TABLET, FILM COATED ORAL EVERY 6 HOURS PRN
Qty: 40 TABLET | Refills: 0 | Status: SHIPPED | OUTPATIENT
Start: 2023-10-17 | End: 2023-10-27

## 2023-10-17 RX ORDER — LEVETIRACETAM 500 MG/1
500 TABLET ORAL 2 TIMES DAILY
Qty: 60 TABLET | Refills: 0 | Status: SHIPPED | OUTPATIENT
Start: 2023-10-17 | End: 2023-10-17 | Stop reason: SDUPTHER

## 2023-10-17 RX ORDER — HYDRALAZINE HYDROCHLORIDE 100 MG/1
100 TABLET, FILM COATED ORAL 2 TIMES DAILY
Qty: 60 TABLET | Refills: 0 | Status: SHIPPED | OUTPATIENT
Start: 2023-10-17 | End: 2023-11-16

## 2023-10-17 RX ORDER — MINOXIDIL 10 MG/1
10 TABLET ORAL 2 TIMES DAILY
Qty: 60 TABLET | Refills: 0 | Status: SHIPPED | OUTPATIENT
Start: 2023-10-17 | End: 2023-11-16

## 2023-10-17 RX ADMIN — GUAIFENESIN 600 MG: 600 TABLET, EXTENDED RELEASE ORAL at 20:52

## 2023-10-17 RX ADMIN — MIRTAZAPINE 7.5 MG: 15 TABLET, FILM COATED ORAL at 20:52

## 2023-10-17 RX ADMIN — CALCIUM ACETATE 1334 MG: 667 CAPSULE ORAL at 09:21

## 2023-10-17 RX ADMIN — GABAPENTIN 100 MG: 100 CAPSULE ORAL at 20:53

## 2023-10-17 RX ADMIN — CALCIUM ACETATE 1334 MG: 667 CAPSULE ORAL at 20:52

## 2023-10-17 RX ADMIN — GUAIFENESIN 600 MG: 600 TABLET, EXTENDED RELEASE ORAL at 09:21

## 2023-10-17 RX ADMIN — AMLODIPINE BESYLATE 10 MG: 10 TABLET ORAL at 09:21

## 2023-10-17 RX ADMIN — CARVEDILOL 25 MG: 25 TABLET, FILM COATED ORAL at 09:21

## 2023-10-17 RX ADMIN — HYDRALAZINE HYDROCHLORIDE 100 MG: 50 TABLET ORAL at 09:21

## 2023-10-17 RX ADMIN — MINOXIDIL 10 MG: 10 TABLET ORAL at 09:24

## 2023-10-17 RX ADMIN — LEVETIRACETAM 500 MG: 500 TABLET, FILM COATED ORAL at 09:21

## 2023-10-17 RX ADMIN — HYDRALAZINE HYDROCHLORIDE 10 MG: 20 INJECTION INTRAMUSCULAR; INTRAVENOUS at 17:10

## 2023-10-17 RX ADMIN — MINOXIDIL 10 MG: 10 TABLET ORAL at 20:53

## 2023-10-17 RX ADMIN — ATORVASTATIN CALCIUM 40 MG: 40 TABLET, FILM COATED ORAL at 20:52

## 2023-10-17 RX ADMIN — CALCIUM ACETATE 1334 MG: 667 CAPSULE ORAL at 15:11

## 2023-10-17 RX ADMIN — PHENAZOPYRIDINE HYDROCHLORIDE 200 MG: 200 TABLET ORAL at 09:23

## 2023-10-17 RX ADMIN — CARVEDILOL 25 MG: 25 TABLET, FILM COATED ORAL at 17:08

## 2023-10-17 RX ADMIN — HYDRALAZINE HYDROCHLORIDE 100 MG: 50 TABLET ORAL at 20:51

## 2023-10-17 ASSESSMENT — COGNITIVE AND FUNCTIONAL STATUS - GENERAL
STANDING UP FROM CHAIR USING ARMS: A LITTLE
CLIMB 3 TO 5 STEPS WITH RAILING: A LITTLE
HELP NEEDED FOR BATHING: A LITTLE
MOBILITY SCORE: 20
CLIMB 3 TO 5 STEPS WITH RAILING: A LITTLE
WALKING IN HOSPITAL ROOM: A LITTLE
MOBILITY SCORE: 20
DAILY ACTIVITIY SCORE: 21
WALKING IN HOSPITAL ROOM: A LITTLE
MOVING TO AND FROM BED TO CHAIR: A LITTLE
DRESSING REGULAR LOWER BODY CLOTHING: A LITTLE
MOVING TO AND FROM BED TO CHAIR: A LITTLE
STANDING UP FROM CHAIR USING ARMS: A LITTLE
HELP NEEDED FOR BATHING: A LITTLE
PERSONAL GROOMING: A LITTLE
DAILY ACTIVITIY SCORE: 21
DRESSING REGULAR LOWER BODY CLOTHING: A LITTLE
PERSONAL GROOMING: A LITTLE

## 2023-10-17 ASSESSMENT — PAIN SCALES - GENERAL
PAINLEVEL_OUTOF10: 7
PAINLEVEL_OUTOF10: 0 - NO PAIN

## 2023-10-17 ASSESSMENT — PAIN - FUNCTIONAL ASSESSMENT: PAIN_FUNCTIONAL_ASSESSMENT: 0-10

## 2023-10-17 ASSESSMENT — VISUAL ACUITY: VA_NORMAL: 1

## 2023-10-17 NOTE — PROGRESS NOTES
Physical Therapy    Physical Therapy    Physical Therapy Evaluation    Patient Name: Ponce Do  MRN: [id number]  Today's Date: 10/17/2023   Time Calculation  Start Time: 0941  Stop Time: 0955  Time Calculation (min): 14 min    Assessment/Plan   PT Assessment  PT Assessment Results: Decreased mobility, Impaired balance  Rehab Prognosis: Good  End of Session Communication: Bedside nurse  End of Session Patient Position: Bed, 2 rail up, Alarm off, not on at start of session  IP OR SWING BED PT PLAN  Inpatient or Swing Bed: Inpatient  PT Plan  Treatment/Interventions: Transfer training, Gait training, Stair training, Balance training  PT Plan: Skilled PT  PT Frequency: 2 times per week  PT Discharge Recommendations: Low intensity level of continued care    Subjective     Current Problem:  1. Hypertensive urgency  CBC    Comprehensive metabolic panel    hydrALAZINE (Apresoline) 100 mg tablet    minoxidil (Loniten) 10 mg tablet    carvedilol (Coreg) 25 mg tablet    hydrOXYzine HCL (Atarax) 25 mg tablet    ipratropium-albuteroL (Duo-Neb) 0.5-2.5 mg/3 mL nebulizer solution      2. Seizure (CMS/HCC)  levETIRAcetam (Keppra) 500 mg tablet        Past Medical History:   Diagnosis Date    Cancer (CMS/HCC)      Past Surgical History:   Procedure Laterality Date    MR HEAD ANGIO WO IV CONTRAST  6/16/2021    MR HEAD ANGIO WO IV CONTRAST 6/16/2021 [facility]    MR NECK ANGIO WO IV CONTRAST  6/16/2021    MR NECK ANGIO WO IV CONTRAST 6/16/2021 [facility]       General Visit Information:  General  Reason for Referral: PT eval and treat; hypertensive urgency admitted from [facility], (+) UTI, had one fall while in hospital  Referred By: Nicole Javier  Past Medical History Relevant to Rehab: ESRD, HD MWF, testicular CA, cerebral infarct with hemiplegia (R), hyperkalemia, HTN, DM, recent pneumonia  Co-Treatment: OT  Co-Treatment Reason: for safety and to maximize therapeutic performance  Patient Position Received:   (patient lying in bed at start and end of eval, call light in reach, all needs met, alarm engaged)  General Comment: tele in place throughout; BLE US negative and chest x-ray negative    Home Living:  Home Living  Home Living Comments: Pt reports he corine does not have a home and has been staying in a hotel on the first floor with no stairs. Pt reports his son was staying with him but his son  two days ago from an overdose    Prior Level of Function:  Prior Function Per Pt/Caregiver Report  Prior Function Comments:  (Pt ambulates with wheeled walker, IND with transfers and ADLs. Pt reports he can do his IADLs himself. He reports he can drive)    Precautions:  Precautions  Precautions Comment: falls precautions, pt reports one fall while in the hospital, and 4 falls this year but reports he does not remember how it happens         Objective     Pain:  Pain Assessment  Pain Assessment:  (7/10 pain in head, pt reports chest pain, when therapist questions patient more he denies chest pain then and says it is nothing to do with his heart. RN made aware)    Cognition:  Cognition  Overall Cognitive Status:  (questionable historian, delayed at times, OA x 3)    General Assessments:         Sensation  Sensation Comment:  (pt denies sensation changes)  Strength  Strength Comments: BLE 4/       Functional Assessments:     Bed Mobility  Bed Mobility:  (SUP>SIT INDEP)  Transfers  Transfer:  (SIT<>STAND SBA with use of WW)  Ambulation/Gait Training  Ambulation/Gait Training Performed:  (Pt ambulates in hallway with WW with CGA. Pt slightly unsteady but no acute LOB. Pt able to navigate turns.)          Extremity/Trunk Assessments:                Outcome Measures:  [facility] Basic Mobility  Turning from your back to your side while in a flat bed without using bedrails: None  Moving from lying on your back to sitting on the side of a flat bed without using bedrails: None  Moving to and from bed to chair (including a  wheelchair): A little  Standing up from a chair using your arms (e.g. wheelchair or bedside chair): A little  To walk in hospital room: A little  Climbing 3-5 steps with railing: A little  Basic Mobility - Total Score: 20            Goals:  Encounter Problems       Encounter Problems (Active)       PT Problem       STG - Pt will transfer STS with MOD INDEP (Progressing)       Start:  10/17/23    Expected End:  10/31/23            STG - Pt will amb >100' using WW with MOD INDEP  (Progressing)       Start:  10/17/23    Expected End:  10/31/23            STG -  Pt will navigate 4 stairs using rail with MOD INDEP  (Progressing)       Start:  10/17/23    Expected End:  10/31/23               Pain - Adult            Education Documentation  Mobility Training, taught by Belkys Ignacio PT at 10/17/2023 10:58 AM.  Learner: Patient  Readiness: Acceptance  Method: Explanation  Response: Verbalizes Understanding    Education Comments  No comments found.

## 2023-10-17 NOTE — PROGRESS NOTES
"Occupational Therapy    Evaluation    Patient Name: Ponce Do  MRN: [id number]  Today's Date: 10/17/2023  Time Calculation  Start Time: 0941  Stop Time: 0955  Time Calculation (min): 14 min                Plan:  Treatment Interventions: ADL retraining, Functional transfer training, UE strengthening/ROM, Endurance training, Fine motor coordination activities  OT Frequency: 3 times per week  OT Discharge Recommendations: Low intensity level of continued care  Treatment Interventions: ADL retraining, Functional transfer training, UE strengthening/ROM, Endurance training, Fine motor coordination activities    Subjective   Current Problem:  1. Hypertensive urgency  CBC    Comprehensive metabolic panel    hydrALAZINE (Apresoline) 100 mg tablet    minoxidil (Loniten) 10 mg tablet    carvedilol (Coreg) 25 mg tablet    hydrOXYzine HCL (Atarax) 25 mg tablet    ipratropium-albuteroL (Duo-Neb) 0.5-2.5 mg/3 mL nebulizer solution      2. Seizure (CMS/HCC)  levETIRAcetam (Keppra) 500 mg tablet        General:  General  Reason for Referral: OT eval and tx; ADLs. hypertensive urgency; had a fall while in the hospital here. initially admitted from dialysis. found to be positive for UTI  Referred By: Yaya  Past Medical History Relevant to Rehab: ESRD, HD MWF, testicular CA, cerebral infarct with hemiplegia (R), hyperkalemia, HTN, DM, recent pneumonia, per patient report, he has had 4 CVAs  Co-Treatment: PT  Co-Treatment Reason: for safety and to maximize therapeutic performance  Patient Position Received:  (patient lying in bed at start and end of eval, call light in reach, all needs met, alarm engaged)  General Comment: tele in place throughout; BLE US negative and chest x-ray negative  Precautions:  Medical Precautions: Fall precautions (activity (no restrictions))       Pain:  Pain Assessment  Pain Assessment: 0-10  Pain Score: 7  Pain Type:  (reports head and chest pain (reports is not \"heart\" pain))    Objective "   Cognition:  Overall Cognitive Status: Within Functional Limits (AX0X4)           Home Living:  Type of Home:  (per patient report, has been living alone in a one story hotel. reports his son recently  2 days ago. reports 4 falls in last 3 months.)  Prior Function:  Level of [place]:  (reports independent in ADLs/IADLs., reports can drive and does grocery shopping. has cousin that can assist PRN, appears to be a questionable historian at times. uses WW at PLOF. also owns rollator)       ADL:  LE Dressing Assistance:  (sitting EOB, adjusting socks at MOD I)  ADL Comments: anticipate MOD I-SBA for ADLs based on performance with transfers and mobility this date    Bed Mobility/Transfers: Bed Mobility  Bed Mobility:  (completed supine <-->sit at MOD I)   and Transfers  Transfer:  (completed STS with support of WW and SBA for safety; patient also engaging in short bout of simple mobility with support of WW and CGA for safety)     Sensation:  Sensation Comment: patient reports no numbness/tingling in sensation or changes in sensation at this time        Hand Function:  Gross Grasp: Functional  Extremities: RUE   RUE : Within Functional Limits and LUE   LUE: Within Functional Limits      Outcome Measures:[facility] Daily Activity  Putting on and taking off regular lower body clothing: A little  Bathing (including washing, rinsing, drying): A little  Putting on and taking off regular upper body clothing: None  Toileting, which includes using toilet, bedpan or urinal: None  Taking care of personal grooming such as brushing teeth: A little  Eating Meals: None  Daily Activity - Total Score: 21        Education Documentation  ADL Training, taught by Nohemy Coto OT at 10/17/2023 10:47 AM.  Learner: Patient  Readiness: Acceptance  Method: Explanation  Response: Verbalizes Understanding, Needs Reinforcement    Education Comments  No comments found.          Goals:  Encounter Problems       Encounter Problems (Active)        OT Goals       STG- patient will complete LB dressing at MOD I with use of ae/ad/dme prn (Progressing)       Start:  10/17/23    Expected End:  10/31/23            STG- patient will complete toileting at MOD I with use of ae/ad/dme prn (Progressing)       Start:  10/17/23    Expected End:  10/31/23            STG- patient will complete transfers from bed, chair, commode to MOD I with use of ae/ad/dme prn (Progressing)       Start:  10/17/23    Expected End:  10/31/23            STG- patient will complete functional mobility household distances at MOD I with use of ae/ad/dme prn (Progressing)       Start:  10/17/23    Expected End:  10/31/23            STG- Patient will tolerate 15 mins of UB strengthening to increase UB strength for ADLs and functional transfers/mobility  (Progressing)       Start:  10/17/23    Expected End:  10/31/23

## 2023-10-17 NOTE — PROGRESS NOTES
NEPHROLOGY PROGRESS NOTE    REASON FOR CONSULT: ESRD on HD MWF    SUBJECTIVE:    Patient seen on dialysis.  Patient underwent dialysis yesterday.  He feels okay.  Denies any shortness of breath.  His blood pressure has been running high.  No nausea or vomiting.    OBJECTIVE:    Visit Vitals  /71   Pulse 82   Temp 37.9 °C (100.2 °F)   Resp 18          Intake/Output Summary (Last 24 hours) at 10/17/2023 1056  Last data filed at 10/16/2023 1717  Gross per 24 hour   Intake 900 ml   Output --   Net 900 ml          General: Awake and Alert, In no distress, Cooperative  HEENT: Oral mucosa moist, EOMI, bruise over the left forehead  NECK: Supple, right IJ TDC noted  CHEST: No crackles, no wheeze, no tachypnea  CVS: S1,S2 heard, no rubs, RRR  ABD: Soft, Non Tender, BS present  EXT: no edema    MEDICATION:    Scheduled medications  amLODIPine, 10 mg, oral, Daily  atorvastatin, 40 mg, oral, Nightly  calcium acetate, 1,334 mg, oral, TID  carvedilol, 25 mg, oral, BID with meals  [Held by provider] clopidogrel, 75 mg, oral, Daily  [Held by provider] enoxaparin, 30 mg, subcutaneous, Daily  gabapentin, 100 mg, oral, Nightly  guaiFENesin, 600 mg, oral, BID  heparin, 2,000 Units, intravenous, After Dialysis  heparin, 2,000 Units, intra-catheter, After Dialysis  heparin, 2,000 Units, intra-catheter, After Dialysis  hydrALAZINE, 100 mg, oral, BID  levETIRAcetam, 500 mg, oral, BID  minoxidil, 10 mg, oral, BID  mirtazapine, 7.5 mg, oral, Nightly  phenazopyridine, 200 mg, oral, BID      Continuous medications     PRN medications  PRN medications: hydrALAZINE, hydrOXYzine HCL, ipratropium-albuteroL, ondansetron, sodium chloride, traMADol     RESULTS:    Lab Results   Component Value Date    WBC 8.2 10/17/2023    HGB 10.4 (L) 10/17/2023    HCT 32.1 (L) 10/17/2023    MCV 91 10/17/2023    PLT 98 (L) 10/17/2023        Lab Results   Component Value Date    CREATININE 6.82 (H) 10/17/2023    BUN 32 (H) 10/17/2023     10/17/2023    K  4.4 10/17/2023     10/17/2023    CO2 26 10/17/2023        Radiology Imaging reviewed      ASSESSMENT/PLAN:     1.  ESRD on HD MWF: Patient underwent dialysis yesterday.  He appears euvolemic.  Electrolytes are stable.  Dialysis tomorrow.  We will dialyze him for 3-1/2 hours with a 2K bath with 2.5 L UF.    2.  Hyperphosphatemia: He is on calcium acetate with meals.    3.  Hypertension: He is on amlodipine Coreg,  minoxidil and hydralazine        Thank You very much for allowing me to participate in the care of this Patient    This document was created using dragon dictation and may contain unintended error    Deni Hmam MD   10/17/23

## 2023-10-17 NOTE — CARE PLAN
Problem: OT Goals  Goal: STG- patient will complete LB dressing at MOD I with use of ae/ad/dme prn  Outcome: Progressing  Goal: STG- patient will complete toileting at MOD I with use of ae/ad/dme prn  Outcome: Progressing  Goal: STG- patient will complete transfers from bed, chair, commode to MOD I with use of ae/ad/dme prn  Outcome: Progressing  Goal: STG- patient will complete functional mobility household distances at MOD I with use of ae/ad/dme prn  Outcome: Progressing  Goal: STG- Patient will tolerate 15 mins of UB strengthening to increase UB strength for ADLs and functional transfers/mobility   Outcome: Progressing

## 2023-10-17 NOTE — PROGRESS NOTES
"Ponce Do is a 58 y.o. male on day 1 of admission presenting with Hypertensive urgency.    Subjective   MRI of the brain without contrast is negative for any acute findings of CVA or hemorrhage.  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  EEG completed with results pending; patient is receiving Keppra 500 mg twice daily for seizure prophylaxis.       Objective     Last Recorded Vitals  Blood pressure 151/79, pulse 72, temperature 37.1 °C (98.8 °F), resp. rate 18, height 1.702 m (5' 7.01\"), weight 58.1 kg (128 lb), SpO2 92 %.    Physical Exam  Constitutional:       Appearance: Normal appearance.   HENT:      Head: Normocephalic.   Eyes:      General: Lids are normal.      Extraocular Movements: Extraocular movements intact.      Pupils: Pupils are equal, round, and reactive to light.   Musculoskeletal:         General: Swelling, tenderness and signs of injury present.      Comments: Traumatic wound to left forehead that is covered with foam gauze with associated edema, erythema, and ecchymosis surrounding site of injury and left orbital socket.   Skin:     Findings: Bruising present.      Comments: Edema, erythema, and ecchymosis to surrounding left forehead injury and left orbital socket   Neurological:      Cranial Nerves: Dysarthria present.      Deep Tendon Reflexes: Reflexes are normal and symmetric.   Psychiatric:         Mood and Affect: Mood normal.         Behavior: Behavior normal.         Thought Content: Thought content normal.         Judgment: Judgment normal.       Neurological Exam  Mental Status  Awake, alert and oriented to person, place and time. Recent and remote memory are intact. Moderate dysarthria present. Follows complex commands. Attention and concentration are normal. Fund of knowledge is appropriate for level of education. Apraxia absent.    Cranial Nerves  CN II: Visual acuity is normal.  CN III, IV, VI: Extraocular movements intact bilaterally. Normal lids and orbits bilaterally. " Pupils equal round and reactive to light bilaterally.  CN V: Facial sensation is normal.  CN VII: Full and symmetric facial movement.  CN VIII: Hearing is normal.  CN IX, X: Palate elevates symmetrically  CN XI:  Right: Sternocleidomastoid strength is weak. 4/5 versus 5/5. Trapezius strength is weak. 4/5 versus 5/5.  Left: Sternocleidomastoid strength is normal. Trapezius strength is normal.  CN XII: Tongue midline without atrophy or fasciculations.    Motor  Normal muscle bulk throughout. No fasciculations present. Normal muscle tone. No abnormal involuntary movements. Strength is 5/5 in all four extremities except as noted.                                             Right                     Left  Deltoid                                   4+                          5   Biceps                                   4+                          5  Brachioradialis                      4+                          5   Triceps                                  4+                          5   Pronator                                4+                          5   Supinator                              4+                           5   Wrist flexor                            4+                          5   Wrist extensor                       4+                          5   Finger flexor                          4+                          5   Finger extensor                     4+                          5   Interossei                              4+                          5   Abductor pollicis brevis         4+                          5   Flexor pollicis brevis             4+                          5   Opponens pollicis                 4+                          5  Extensor digitorum               4+                          5  Abductor digiti minimi           4+                          5  Glutei                                    4+                          5  Hip abductor                         4+                           5  Hip adductor                         4+                          5   Iliopsoas                               4+                          5   Quadriceps                           4+                          5   Hamstring                             4+                          5   Gastrocnemius                     4+                           5   Anterior tibialis                      4+                          5   Posterior tibialis                    4+                          5  Ankle dorsiflexor                   4+                          5  Ankle plantar flexor              4+                           5  Extensor hallucis longus      4+                           5    Sensory  Sensation is intact to light touch, pinprick, vibration and proprioception in all four extremities.    Reflexes  Deep tendon reflexes are 2+ and symmetric in all four extremities.    Coordination  Right: Finger-to-nose abnormality: Rapid alternating movement abnormality: Heel-to-shin abnormality:Left: Finger-to-nose normal. Rapid alternating movement normal. Heel-to-shin normal.    Gait  Casual gait: Narrow stance. Ataxic gait.  Uses wheeled walker at baseline for safe mobilization.    Relevant Results                [place] Coma Scale  Best Eye Response: Spontaneous  Best Verbal Response: Oriented  Best Motor Response: Follows commands  [place] Coma Scale Score: 15  Scheduled medications  amLODIPine, 10 mg, oral, Daily  atorvastatin, 40 mg, oral, Nightly  calcium acetate, 1,334 mg, oral, TID  carvedilol, 25 mg, oral, BID with meals  [Held by provider] clopidogrel, 75 mg, oral, Daily  [Held by provider] enoxaparin, 30 mg, subcutaneous, Daily  gabapentin, 100 mg, oral, Nightly  guaiFENesin, 600 mg, oral, BID  heparin, 2,000 Units, intravenous, After Dialysis  heparin, 2,000 Units, intra-catheter, After Dialysis  heparin, 2,000 Units, intra-catheter, After Dialysis  hydrALAZINE, 100 mg, oral, BID  levETIRAcetam, 500 mg, oral,  BID  minoxidil, 10 mg, oral, BID  mirtazapine, 7.5 mg, oral, Nightly      Continuous medications     PRN medications  PRN medications: hydrALAZINE, hydrOXYzine HCL, ipratropium-albuteroL, ondansetron, sodium chloride, traMADol    Results for orders placed or performed during the hospital encounter of 10/13/23 (from the past 24 hour(s))   Hepatitis B surface antigen   Result Value Ref Range    Hepatitis B Surface AG Nonreactive Nonreactive   Hepatitis B core antibody, total   Result Value Ref Range    Hepatitis B Core AB- Total Nonreactive Nonreactive   Hepatitis B surface antibody   Result Value Ref Range    Hepatitis B Surface AB 13.9 (H) <10.0 mIU/mL   CBC   Result Value Ref Range    WBC 8.2 4.4 - 11.3 x10*3/uL    nRBC 0.0 0.0 - 0.0 /100 WBCs    RBC 3.52 (L) 4.50 - 5.90 x10*6/uL    Hemoglobin 10.4 (L) 13.5 - 17.5 g/dL    Hematocrit 32.1 (L) 41.0 - 52.0 %    MCV 91 80 - 100 fL    MCH 29.5 26.0 - 34.0 pg    MCHC 32.4 32.0 - 36.0 g/dL    RDW 18.9 (H) 11.5 - 14.5 %    Platelets 98 (L) 150 - 450 x10*3/uL    MPV 10.1 7.5 - 11.5 fL   Comprehensive Metabolic Panel   Result Value Ref Range    Glucose 81 74 - 99 mg/dL    Sodium 136 136 - 145 mmol/L    Potassium 4.4 3.5 - 5.3 mmol/L    Chloride 100 98 - 107 mmol/L    Bicarbonate 26 21 - 32 mmol/L    Anion Gap 14 10 - 20 mmol/L    Urea Nitrogen 32 (H) 6 - 23 mg/dL    Creatinine 6.82 (H) 0.50 - 1.30 mg/dL    eGFR 9 (L) >60 mL/min/1.73m*2    Calcium 8.1 (L) 8.6 - 10.3 mg/dL    Albumin 3.3 (L) 3.4 - 5.0 g/dL    Alkaline Phosphatase 52 33 - 120 U/L    Total Protein 6.4 6.4 - 8.2 g/dL    AST 13 9 - 39 U/L    Bilirubin, Total 0.4 0.0 - 1.2 mg/dL    ALT 11 10 - 52 U/L       MR brain wo IV contrast    Result Date: 10/17/2023  Interpreted By:  Octavia Morales, STUDY: MR BRAIN WO IV CONTRAST;  10/16/2023 8:03 pm   INDICATION: Signs/Symptoms:seizure.   COMPARISON: None.   ACCESSION NUMBER(S): [id number]   ORDERING CLINICIAN: LINDEN MORA   TECHNIQUE: Axial T2, FLAIR, DWI, gradient echo  T2 and sagittal and coronal T1 weighted images of brain were acquired.   FINDINGS: Diffusion-weighted imaging demonstrates no evidence of an acute infarct.   Ventricles, cortical sulci and basal cisterns are within normal limits given the patient's stated age. There is no extra-axial fluid collection, mass effect or midline shift.   There is a moderate degree of nonspecific patchy and confluence T2 and FLAIR hyperintense signal compatible with microangiopathy. There is encephalomalacia and gliosis within the right cerebellar hemisphere and right brachium pontis compatible with a prior infarct. There are old lacunar infarcts within the bilateral thalami.   Major intracranial flow voids at the skull base are unremarkable. Minimal nonspecific fluid within the right mastoid air cells. Left mastoid air cells are clear. Paranasal sinuses are clear.   Nonspecific scalp edema within the frontal region. Prominent ossification along the anterior falx.   Cerebellar tonsils are above the foramen magnum. Pituitary and sella are not enlarged. Punctate focus of susceptibility artifact within the left thalamus compatible with a chronic microhemorrhage.       No acute intracranial infarct or mass effect.   Old infarct within the right cerebellum. Old lacunar infarcts within the thalami.   Moderate degree of nonspecific white matter signal compatible with microangiopathy.   Frontal scalp soft tissue swelling.   MACRO: None   Signed by: Octavia Morales 10/17/2023 9:13 AM Dictation workstation:   [id number]    US scrotum    Result Date: 10/16/2023  Interpreted By:  Claus Marie, STUDY: US SCROTUM;  10/16/2023 1:43 pm   INDICATION: Signs/Symptoms:Hx of testicular CA.   COMPARISON: None.   ACCESSION NUMBER(S): [id number]   ORDERING CLINICIAN: MALCOM SINGH   TECHNIQUE: Multiple ultrasonographic images of scrotum and tested were obtained. Color Doppler imaging and waveform analysis was also performed.   FINDINGS: HEMISCROTUM:   Reportedly  status post left orchiectomy.   REMAINING, PRESUMED RIGHT TESTICLE: The left testicle measures 4.4 x 3.5 x 1.8 cm.  There are several punctate echogenic foci indicating microlithiasis. The testicular parenchyma otherwise is homogeneous without discrete mass lesion. Normal vascularity and Doppler waveforms are observed in the left testicle.   RIGHT EPIDIDYMIS: The left epididymis measures 1.1 x 0.7 by 0.7 cm. 2 cysts are noted at the epididymal head measuring 3 x 3 and 4 x 5 mm.   Pampiniform plexus: No evidence of varicoceles       Right testicular microlithiasis. Otherwise unremarkable.   Signed by: Claus Marie 10/16/2023 5:11 PM Dictation workstation:   [id number]    CT angio chest for pulmonary embolism    Result Date: 10/16/2023  Interpreted By:  Timothy Beckford, STUDY: CT ANGIO CHEST FOR PULMONARY EMBOLISM;  10/16/2023 2:02 pm   INDICATION: Signs/Symptoms:r/o pe.   COMPARISON: CT chest, abdomen and pelvis 08/01/2023   ACCESSION NUMBER(S): [id number]   ORDERING CLINICIAN: LUIS M HELLER   TECHNIQUE: Helical data acquisition of the chest was obtained after administration of 75ml IV Omnipaque 350. Images were reformatted in coronal and sagittal planes. Axial and coronal MIP images were created and reviewed.   FINDINGS: POTENTIAL LIMITATIONS OF THE STUDY: None   HEART AND VESSELS: No discrete filling defects within the main pulmonary artery or its branches.   Main pulmonary artery and its branches are normal in caliber.   The thoracic aorta is of normal course and caliber without vascular calcifications.   Severe coronary artery calcifications are seen.The study is not optimized for evaluation of coronary arteries.   Mild-to-moderate cardiomegaly biatrial enlargement.   Trace pericardial effusion.   MEDIASTINUM AND ELIZABETH, LOWER NECK AND AXILLA: The visualized thyroid gland is within normal limits.   [place] left-greater-than-right soft tissue hilar thickening/adenopathy, similar to the chest CT from  08/01/2023. No mediastinal, axillary supraclavicular nodes.   Small hiatal hernia.   LUNGS AND AIRWAYS: Central airways are patent without endobronchial lesions. Small amount of retained secretions.Mild upper lobe predominant centrilobular and paraseptal emphysema. No focal consolidation. No pneumothorax. No pleural effusion. Mild bibasilar atelectasis. No suspicious pulmonary nodule.   UPPER ABDOMEN: The visualized subdiaphragmatic structures demonstrate no remarkable findings.   CHEST WALL AND OSSEOUS STRUCTURES: Right-sided double-lumen catheter with the tip terminating in the right atrium. There are no suspicious osseous lesions. Multilevel degenerative changes are present       1.  No evidence of pulmonary embolism. 2. Mild upper lobe predominant centrilobular and paraseptal emphysema. 3. [place] left-greater-than-right hilar soft tissue thickening/adenopathy, similar to the CT chest from 08/01/2023, likely reactive. Recommend short-term follow-up to document resolution..     Signed by: Timothy Beckford 10/16/2023 3:24 PM Dictation workstation:   [id number]    CT head wo IV contrast    Result Date: 10/15/2023  Interpreted By:  Angel Cristobal, STUDY: CT HEAD WO IV CONTRAST;  10/15/2023 3:28 pm   INDICATION: Signs/Symptoms:Seizure.   COMPARISON: 10/15/2023   ACCESSION NUMBER(S): [id number]   ORDERING CLINICIAN: ROBBY RAY   TECHNIQUE: Noncontrast axial CT scan of head was performed. Multiplanar reconstructions   FINDINGS: No acute intracranial hemorrhage, mass effect, midline shift, or herniation. No evidence of hydrocephalus. Mild periventricular and subcortical deep white matter hypodensity suggestive of chronic microangiopathic change. The ventricles and sulci are unremarkable for age. Redemonstrated right cerebellar encephalomalacia. Small right mastoid effusion. No acute osseous abnormality of the calvarium. No destructive bone lesion. Left forehead soft tissue swelling, increased.  Atherosclerosis.       No acute intracranial abnormality. Consider follow-up with MRI as warranted.   Left forehead soft tissue swelling, increased.   Signed by: Angel Cristobal 10/15/2023 3:44 PM Dictation workstation:   [id number]    CT head wo IV contrast    Result Date: 10/15/2023  Interpreted By:  Annamarie Phan, STUDY: CT HEAD WO IV CONTRAST;  10/15/2023 8:12 am   INDICATION: Signs/Symptoms:fall.   COMPARISON: 07/11/2023   ACCESSION NUMBER(S): [id number]   ORDERING CLINICIAN: ROBBY RAY   TECHNIQUE: Examination was performed in the axial plane with sagittal and coronal reconstructions. Bone and soft tissue algorithms were performed.   FINDINGS: INTRACRANIAL: The ventricular system is symmetrical and nondilated. No mass or mass effect is identified. There is no hemorrhage or subdural fluid collection. There is no acute infarct. There is a fairly large remote right cerebellar hemispheric infarct. There is a small left frontal scalp hematoma. There is no underlying fracture of the calvarium.   EXTRACRANIAL: Visualized paranasal sinuses and mastoids are clear.       1. No acute intracranial pathology. 2. Left frontal scalp hematoma. 3. Remote right cerebellar hemispheric infarct.   MACRO: None   Signed by: Annamarie Phan 10/15/2023 8:22 AM Dictation workstation:   [id number]    Vascular US lower extremity venous duplex bilateral    Result Date: 10/14/2023  Interpreted By:  Ponce Nunes, STUDY: [facility] US LOWER EXTREMITY VENOUS DUPLEX BILATERAL; 10/14/2023 1:25 am   INDICATION: Signs/Symptoms:pain.   COMPARISON: None.   ACCESSION NUMBER(S): [id number]   ORDERING CLINICIAN: MARC CROFT   TECHNIQUE: 2D grayscale and color-flow duplex images were obtained along with Doppler spectral waveform analysis of the deep venous system of the lower extremity from the groin to the knee. Attempts were made to image the calf veins. Venous compression and augmentation were performed.   FINDINGS: Right Lower Extremity:  There is normal compressibility and flow within the visualized vessels of the deep venous system of this lower extremity.   Left Lower Extremity: There is normal compressibility and flow within the visualized vessels of the deep venous system of this lower extremity.         No evidence for deep venous thrombosis within the limits of this examination.   MACRO: None.   Signed by: Ponce Nunes 10/14/2023 1:43 AM Dictation workstation:   [id number]    CT pelvis wo IV contrast    Result Date: 10/14/2023  Interpreted By:  Kavon Piedra, STUDY: CT PELVIS WO IV CONTRAST;  10/13/2023 11:50 pm   INDICATION: Signs/Symptoms:left groin pain. hx of testicular cancer.   COMPARISON: 8/1/2023   ACCESSION NUMBER(S): [id number]   ORDERING CLINICIAN: MARC CROFT   TECHNIQUE: Contiguous axial images of the pelvis were obtained without intravenous contrast. Coronal and sagittal reformatted images were obtained from the axial images.   FINDINGS: Limited evaluation the soft tissues secondary to lack of intravenous. Gallstones noted in the gallbladder. No evidence of bowel obstruction.   Urinary bladder is underdistended and not well evaluated. There is however prominent urinary bladder wall thickening and edema.   Limited evaluation of the scrotum.   No evidence acute displaced pelvic fracture. Vascular calcifications.       Urinary bladder wall thickening and edema compatible with cystitis; please clinically correlate with urinalysis.   No evidence of inguinal hernia.   Limited evaluation the scrotum and if there is concern for scrotal or testicular pathology, scrotal ultrasound is recommended for further evaluation.   MACRO: None   Signed by: Kavon Piedra 10/14/2023 12:41 AM Dictation workstation:   [id number]    XR chest 2 views    Result Date: 10/13/2023  Interpreted By:  Flynn Messer, STUDY: XR CHEST 2 VIEWS;  10/13/2023 5:58 pm   INDICATION: Signs/Symptoms:CP.   COMPARISON: Chest radiograph 08/13/2023   ACCESSION NUMBER(S):  [id number]   ORDERING CLINICIAN: ROBIN MCKINLEY   FINDINGS: SUPPORT DEVICES: Right IJ central venous catheter terminates at the cavoatrial junction. Metallic device overlies the right heart, unchanged from prior exams, possibly reflecting a interatrial septal occlusion device.   CARDIOMEDIASTINAL SILHOUETTE: Cardiomediastinal silhouette is normal in size and configuration.   LUNGS: No pulmonary consolidation, pleural effusion or pneumothorax.   ABDOMEN: No remarkable upper abdominal findings.   BONES: No acute osseous abnormality.       1. No acute cardiopulmonary process.     Signed by: Flynn Messer 10/13/2023 6:33 PM Dictation workstation:   [id number]                                       Assessment/Plan   This patient currently has cardiac telemetry ordered; if you would like to modify or discontinue the telemetry order, click here to go to the orders activity to modify/discontinue the order.  Principal Problem:    Hypertensive urgency    -EEG completed with results pending; continue seizure precautions while hospitalized.  Continue Keppra 500 mg p.o. twice daily for seizure prophylaxis until completion of testing.  If EEG is significant for underlying epilepsy, then patient to continue this medication.  Otherwise, medication will be discontinued as this is patient's first documented seizure.  Patient to restrict all driving practices x6 months post seizure activity.  -Patient to restart Plavix 75 mg p.o. daily for CVA prophylaxis when thrombocytopenia has resolved; labs currently continue to trend downward at only 98 today.   -Recommendations for needs during hospitalization and at discharge via PT, OT, and SLP  -Nephrology consulting for management of hemodialysis; appreciate recommendations  -Urology consulting; recommending scrotal ultrasound  -Continue promotion of lifestyle modifications, such as: Strict BP and glycemic control, dietary habit changes, incorporation of daily exercise regimen, adherence  to all prescription/OTC medication schedules, attendance to all follow-up appointments, cessation from smoking if applicable, abstinence from alcohol and illicit drug use if applicable  -Patient to follow-up with PCP in 1 to 2 weeks postdischarge  -Patient to follow-up with Dr. Park Solano in 2 to 3 months postdischarge  -Patient is appropriate for discharge from neurological standpoint, with patient to be contacted with final EEG results once they become available only if testing is significant for underlying epilepsy.  It is recommended for patient to be sent back to SNF with a conditional prescription for Keppra in case his EEG is positive and requires continuation of this medication.    Total face-to-face time spent with patient today was approximately 30 minutes; more than 50% of my time was spent counseling patient on: preparation to see patient via chart review of resulted laboratory values, radiographic imaging, prescribed medications, and impairment of involved organ systems. Time also spent on medical examination, placing appropriate orders for testing/medications, communicating with other health care providers, counseling/educating the patient/family, and care coordination.    Neurology to sign off at this time. Thank you for the opportunity to be a part of this patient's multidisciplinary treatment team.  If any additional questions or concerns arise, please do not hesitate to contact me or the on-call neurologist via [employer].    This note was created using voice recognition transcription software. Despite proofreading, unintentional typographical errors may be present. Please contact the department of neurology with any questions or concerns.         Monica Haro, SHINE-CNP

## 2023-10-17 NOTE — DISCHARGE SUMMARY
Discharge Diagnosis  Hypertensive urgency    Issues Requiring Follow-Up  Patient fully evaluated on October 17 and blood pressure under better control.    Discharge Meds     Your medication list        ASK your doctor about these medications        Instructions Last Dose Given Next Dose Due   amLODIPine 10 mg tablet  Commonly known as: Norvasc           atorvastatin 40 mg tablet  Commonly known as: Lipitor           calcium acetate 667 mg capsule  Commonly known as: Phoslo           clopidogrel 75 mg tablet  Commonly known as: Plavix           gabapentin 100 mg capsule  Commonly known as: Neurontin           guaiFENesin 600 mg 12 hr tablet  Commonly known as: Mucinex           hydrALAZINE 50 mg tablet  Commonly known as: Apresoline           minoxidil 2.5 mg tablet  Commonly known as: Loniten           mirtazapine 7.5 mg tablet  Commonly known as: Remeron                    Test Results Pending At Discharge  Pending Labs       Order Current Status    PSA, total and free In process    Blood Culture Preliminary result    Blood Culture Preliminary result    Extra Tubes Preliminary result    Green Top Preliminary result    Lavender Top Preliminary result    Light Blue Top Preliminary result    SST TOP Preliminary result            Hospital Course           Enrique Gomez MD  Physician  Internal Medicine     Progress Notes      Signed     Date of Service: 10/16/2023  4:19 PM     Signed       Expand All Collapse All    Ponce Do is a 58 y.o. male on day 0 of admission presenting with Hypertensive urgency.           Subjective   Patient with fall earlier.  Clinically unchanged.              Objective   Last Recorded Vitals  /88   Pulse 85   Temp 37.1 °C (98.8 °F) (Temporal)   Resp 18   Wt 58.1 kg (128 lb)   SpO2 96%   Intake/Output last 3 Shifts:     Intake/Output Summary (Last 24 hours) at 10/16/2023 1619  Last data filed at 10/16/2023 1205      Gross per 24 hour   Intake 600 ml   Output 100 ml   Net 500  ml         Admission Weight  Weight: 58.1 kg (128 lb) (10/13/23 1736)     Daily Weight  10/13/23 : 58.1 kg (128 lb)     Image Results  CT angio chest for pulmonary embolism  Narrative: Interpreted By:  Timothy Beckford,   STUDY:  CT ANGIO CHEST FOR PULMONARY EMBOLISM;  10/16/2023 2:02 pm      INDICATION:  Signs/Symptoms:r/o pe.      COMPARISON:  CT chest, abdomen and pelvis 08/01/2023      ACCESSION NUMBER(S):  [id number]      ORDERING CLINICIAN:  LUIS M HELLER      TECHNIQUE:  Helical data acquisition of the chest was obtained after  administration of 75ml IV Omnipaque 350. Images were reformatted in  coronal and sagittal planes. Axial and coronal MIP images were  created and reviewed.      FINDINGS:  POTENTIAL LIMITATIONS OF THE STUDY: None      HEART AND VESSELS:  No discrete filling defects within the main pulmonary artery or its  branches.      Main pulmonary artery and its branches are normal in caliber.      The thoracic aorta is of normal course and caliber without vascular  calcifications.      Severe coronary artery calcifications are seen.The study is not  optimized for evaluation of coronary arteries.      Mild-to-moderate cardiomegaly biatrial enlargement.      Trace pericardial effusion.      MEDIASTINUM AND ELIZABETH, LOWER NECK AND AXILLA:  The visualized thyroid gland is within normal limits.      [place] left-greater-than-right soft tissue hilar  thickening/adenopathy, similar to the chest CT from 08/01/2023. No  mediastinal, axillary supraclavicular nodes.      Small hiatal hernia.      LUNGS AND AIRWAYS:  Central airways are patent without endobronchial lesions. Small  amount of retained secretions.Mild upper lobe predominant  centrilobular and paraseptal emphysema. No focal consolidation. No  pneumothorax. No pleural effusion. Mild bibasilar atelectasis. No  suspicious pulmonary nodule.      UPPER ABDOMEN:  The visualized subdiaphragmatic structures demonstrate no remarkable  findings.       CHEST WALL AND OSSEOUS STRUCTURES:  Right-sided double-lumen catheter with the tip terminating in the  right atrium. There are no suspicious osseous lesions. Multilevel  degenerative changes are present      Impression: 1.  No evidence of pulmonary embolism.  2. Mild upper lobe predominant centrilobular and paraseptal emphysema.  3. [place] left-greater-than-right hilar soft tissue  thickening/adenopathy, similar to the CT chest from 08/01/2023,  likely reactive. Recommend short-term follow-up to document  resolution..          Signed by: Timothy Beckford 10/16/2023 3:24 PM  Dictation workstation:   [id number]        Physical Exam     Relevant Results                       Assessment/Plan   This patient currently has cardiac telemetry ordered; if you would like to modify or discontinue the telemetry order, click hereto go to the orders activity to modify/discontinue the order.                 Principal Problem:    Hypertensive urgency              Enrique Gomez MD  Physician  Internal Medicine     H&P      Addendum     Date of Service: 10/14/2023  5:59 AM      Addendum        Expand All Collapse All    History Of Present Illness  Ponce Do is a 58 y.o. male presenting to emergency department from dialysis for evaluation of hypertension.  Patient was reportedly at dialysis today and remained hypertensive throughout his session.  Patient states he was able to complete his full dialysis session.  Patient was sent into emergency department for evaluation of hypertension.  Patient does report an increase in leg pain.  Patient denies chest pain or dizziness.  Patient denies recent illness, nausea, vomiting, diarrhea.     In ED, chest x-ray completed and negative for acute process per radiology review.  Bilateral lower extremity ultrasound completed and negative for acute process.  CT of the abdomen pelvis completed showing bladder wall thickening and testicular abnormalities concerning for malignancy  consistent with patient's history of testicular cancer.  Hemoglobin 12.2, hematocrit 37.2, BNP 1482, troponin 25, glucose 126, sodium 135, GFR 14, creatinine 4.66.  Blood cultures and urine culture pending.  UTI positive.  Patient started on IV ceftriaxone.  Patient medicated with multiple doses of hydralazine.  Blood pressure 182/87, heart rate 82, respirations 18, temperature 36.8 °C, SPO2 98% on room air.  Patient admitted to telemetry observation under the care of Dr. Gomez who will continue to follow.  I was asked to do H&P and place initial admission orders.     Past Medical History  ESRD with hemodialysis [facility], testicular cancer, cerebral infarct with hemiplegia (right-sided), hyperkalemia, hypertension, hyponatremia, diabetes, recent pneumonia with recurrent hemoptysis     Surgical History  Bronchoscopy     Social History  He has no history on file for tobacco use, alcohol use, and drug use.     Family History  Reviewed and noncontributory     Allergies  NKDA/NKA     Review of Systems  A 10 point review of systems was completed and negative except what is listed in HPI  Physical Exam  Constitutional:       Appearance: Normal appearance.   HENT:      Mouth/Throat:      Mouth: Mucous membranes are moist.      Pharynx: Oropharynx is clear.   Eyes:      Pupils: Pupils are equal, round, and reactive to light.   Cardiovascular:      Rate and Rhythm: Normal rate and regular rhythm.   Pulmonary:      Effort: Pulmonary effort is normal.      Breath sounds: Normal breath sounds.   Abdominal:      General: Bowel sounds are normal.      Palpations: Abdomen is soft.   Musculoskeletal:         General: Normal range of motion.      Cervical back: Normal range of motion.   Skin:     General: Skin is warm and dry.      Capillary Refill: Capillary refill takes less than 2 seconds.   Neurological:      General: No focal deficit present.      Mental Status: He is alert and oriented to person, place, and  "time. Mental status is at baseline.   Psychiatric:         Mood and Affect: Mood normal.         Behavior: Behavior normal.            Last Recorded Vitals  Blood pressure (!) 182/87, pulse 82, temperature 37.1 °C (98.8 °F), resp. rate 18, height 1.702 m (5' 7\"), weight 58.1 kg (128 lb), SpO2 98 %.     Relevant Results  Vascular US lower extremity venous duplex bilateral     Result Date: 10/14/2023  Interpreted By:  Ponce Nunes, STUDY: VASC US LOWER EXTREMITY VENOUS DUPLEX BILATERAL; 10/14/2023 1:25 am   INDICATION: Signs/Symptoms:pain.   COMPARISON: None.   ACCESSION NUMBER(S): [id number]   ORDERING CLINICIAN: MARC CROFT   TECHNIQUE: 2D grayscale and color-flow duplex images were obtained along with Doppler spectral waveform analysis of the deep venous system of the lower extremity from the groin to the knee. Attempts were made to image the calf veins. Venous compression and augmentation were performed.   FINDINGS: Right Lower Extremity: There is normal compressibility and flow within the visualized vessels of the deep venous system of this lower extremity.   Left Lower Extremity: There is normal compressibility and flow within the visualized vessels of the deep venous system of this lower extremity.          No evidence for deep venous thrombosis within the limits of this examination.   MACRO: None.   Signed by: Ponce Nunes 10/14/2023 1:43 AM Dictation workstation:   [id number]     CT pelvis wo IV contrast     Result Date: 10/14/2023  Interpreted By:  Kavon Piedra, STUDY: CT PELVIS WO IV CONTRAST;  10/13/2023 11:50 pm   INDICATION: Signs/Symptoms:left groin pain. hx of testicular cancer.   COMPARISON: 8/1/2023   ACCESSION NUMBER(S): [id number]   ORDERING CLINICIAN: MARC CROFT   TECHNIQUE: Contiguous axial images of the pelvis were obtained without intravenous contrast. Coronal and sagittal reformatted images were obtained from the axial images.   FINDINGS: Limited evaluation the soft tissues secondary " to lack of intravenous. Gallstones noted in the gallbladder. No evidence of bowel obstruction.   Urinary bladder is underdistended and not well evaluated. There is however prominent urinary bladder wall thickening and edema.   Limited evaluation of the scrotum.   No evidence acute displaced pelvic fracture. Vascular calcifications.        Urinary bladder wall thickening and edema compatible with cystitis; please clinically correlate with urinalysis.   No evidence of inguinal hernia.   Limited evaluation the scrotum and if there is concern for scrotal or testicular pathology, scrotal ultrasound is recommended for further evaluation.   MACRO: None   Signed by: Kavon Piedra 10/14/2023 12:41 AM Dictation workstation:   [id number]     XR chest 2 views     Result Date: 10/13/2023  Interpreted By:  Flynn Messer, STUDY: XR CHEST 2 VIEWS;  10/13/2023 5:58 pm   INDICATION: Signs/Symptoms:CP.   COMPARISON: Chest radiograph 08/13/2023   ACCESSION NUMBER(S): [id number]   ORDERING CLINICIAN: ROBIN MCKINLEY   FINDINGS: SUPPORT DEVICES: Right IJ central venous catheter terminates at the cavoatrial junction. Metallic device overlies the right heart, unchanged from prior exams, possibly reflecting a interatrial septal occlusion device.   CARDIOMEDIASTINAL SILHOUETTE: Cardiomediastinal silhouette is normal in size and configuration.   LUNGS: No pulmonary consolidation, pleural effusion or pneumothorax.   ABDOMEN: No remarkable upper abdominal findings.   BONES: No acute osseous abnormality.        1. No acute cardiopulmonary process.     Signed by: Flynn Messer 10/13/2023 6:33 PM Dictation workstation:   [id number]                 Results for orders placed or performed during the hospital encounter of 10/13/23 (from the past 24 hour(s))   Troponin I, High Sensitivity   Result Value Ref Range     Troponin I, High Sensitivity 25 (H) 0 - 20 ng/L   Magnesium   Result Value Ref Range     Magnesium 2.04 1.60 - 2.40 mg/dL    Comprehensive Metabolic Panel   Result Value Ref Range     Glucose 126 (H) 74 - 99 mg/dL     Sodium 135 (L) 136 - 145 mmol/L     Potassium 4.7 3.5 - 5.3 mmol/L     Chloride 99 98 - 107 mmol/L     Bicarbonate 25 21 - 32 mmol/L     Anion Gap 16 10 - 20 mmol/L     Urea Nitrogen 19 6 - 23 mg/dL     Creatinine 4.66 (H) 0.50 - 1.30 mg/dL     eGFR 14 (L) >60 mL/min/1.73m*2     Calcium 8.5 (L) 8.6 - 10.3 mg/dL     Albumin 4.0 3.4 - 5.0 g/dL     Alkaline Phosphatase 54 33 - 120 U/L     Total Protein 7.8 6.4 - 8.2 g/dL     AST 25 9 - 39 U/L     Bilirubin, Total 0.4 0.0 - 1.2 mg/dL     ALT 16 10 - 52 U/L   CBC and Auto Differential   Result Value Ref Range     WBC 8.6 4.4 - 11.3 x10*3/uL     nRBC 0.0 0.0 - 0.0 /100 WBCs     RBC 4.10 (L) 4.50 - 5.90 x10*6/uL     Hemoglobin 12.2 (L) 13.5 - 17.5 g/dL     Hematocrit 37.2 (L) 41.0 - 52.0 %     MCV 91 80 - 100 fL     MCH 29.8 26.0 - 34.0 pg     MCHC 32.8 32.0 - 36.0 g/dL     RDW 18.6 (H) 11.5 - 14.5 %     Platelets 102 (L) 150 - 450 x10*3/uL     MPV 10.8 7.5 - 11.5 fL     Neutrophils % 74.6 40.0 - 80.0 %     Immature Granulocytes %, Automated 0.7 0.0 - 0.9 %     Lymphocytes % 16.6 13.0 - 44.0 %     Monocytes % 4.7 2.0 - 10.0 %     Eosinophils % 2.9 0.0 - 6.0 %     Basophils % 0.5 0.0 - 2.0 %     Neutrophils Absolute 6.41 1.20 - 7.70 x10*3/uL     Immature Granulocytes Absolute, Automated 0.06 0.00 - 0.70 x10*3/uL     Lymphocytes Absolute 1.42 1.20 - 4.80 x10*3/uL     Monocytes Absolute 0.40 0.10 - 1.00 x10*3/uL     Eosinophils Absolute 0.25 0.00 - 0.70 x10*3/uL     Basophils Absolute 0.04 0.00 - 0.10 x10*3/uL   B-type natriuretic peptide   Result Value Ref Range     BNP 1,482 (H) 0 - 99 pg/mL   Urinalysis with Reflex Microscopic and Culture   Result Value Ref Range     Color, Urine Straw Straw, Yellow     Appearance, Urine Clear Clear     Specific Gravity, Urine 1.008 1.005 - 1.035     pH, Urine 9.0 (N) 5.0, 5.5, 6.0, 6.5, 7.0, 7.5, 8.0     Protein, Urine >=500 (3+) (N) NEGATIVE  mg/dL     Glucose, Urine 150 (2+) (A) NEGATIVE mg/dL     Blood, Urine NEGATIVE NEGATIVE     Ketones, Urine NEGATIVE NEGATIVE mg/dL     Bilirubin, Urine NEGATIVE NEGATIVE     Urobilinogen, Urine <2.0 <2.0 mg/dL     Nitrite, Urine NEGATIVE NEGATIVE     Leukocyte Esterase, Urine TRACE (A) NEGATIVE   Extra Urine Gray Tube   Result Value Ref Range     Extra Tube Hold for add-ons.     Urinalysis Microscopic Only   Result Value Ref Range     WBC, Urine 11-20 (A) 1-5, NONE /HPF     RBC, Urine 3-5 NONE, 1-2, 3-5 /HPF            Assessment/Plan   Ponce is a 58-year-old male patient presenting to emergency department from hemodialysis today with complaint of elevated blood pressure.  Patient states he remained hypertensive throughout his whole dialysis session but was able to complete the session.  Patient medicated with multiple doses of hydralazine in ED, current blood pressure 182/87.  BNP 1482, troponin 25.  Labs consistent with ESRD.  Patient found to have a UTI and started on IV ceftriaxone.  Imaging showing bladder wall thickening but negative for acute process per radiology review.  Patient medicated for pain and admitted for further medical management.  Hypertensive urgency  Admit to telemetry observation per Dr. Gomez  See imaging results above  Hydralazine IV x2 in ED  Metoprolol IV x1 as needed  Telemetry monitoring  Resume home medications when med rec is complete  Morphine as needed/Zofran as needed  Trend troponin     2.  ESRD/HTN/hyponatremia/hyper kalemia/diabetes/testicular cancer     Renal diet  Continue home medications when med rec is complete     3.  DVT Ppx  SCDs  No chemical prophylaxis 2/2 recurrent hemoptysis  Activity as tolerated     I spent 20 minutes in the professional and overall care of this patient.        Brie Hameed, SHINE-CNP  Patient fully evaluated and plan as above.               Revision History    Patient fully evaluated on October 15.  Continue to monitor blood pressure.   Correct electrolytes.  Appreciate urologic and nephrology consultations.  Patient fully evaluated on October 16.  Patient with questionable seizure activity and EEG is pending.  Patient started on Keppra.  Neurologic examination without focus today.  Dialyze patient and recheck labs in AM.           Enrique Gomez MD                         Pertinent Physical Exam At Time of Discharge  Physical Exam    Outpatient Follow-Up  No future appointments.    Patient fully evaluated on October 17.  No further seizure activity.  Patient cleared for discharge to ECF when certified.  Enrique Gomez MD

## 2023-10-17 NOTE — CARE PLAN
The patient's goals for the shift include   Problem: Fall/Injury  Goal: Not fall by end of shift  Outcome: Progressing     Problem: Fall/Injury  Goal: Be free from injury by end of the shift  Outcome: Progressing

## 2023-10-17 NOTE — PROGRESS NOTES
"Ponce Do is a 58 y.o. male on day 1 of admission presenting with Hypertensive urgency.    Subjective   Follow-up hx of testicular cancer with left groin/hip pain.  Patient states this pain has improved.  Scrotal ultrasound with right testicular microlithiasis, otherwise unremarkable.    Objective     Physical Exam  Constitutional:       General: He is not in acute distress.     Appearance: He is normal weight.   Pulmonary:      Effort: Pulmonary effort is normal.   Neurological:      Mental Status: He is alert and oriented to person, place, and time.   Psychiatric:         Mood and Affect: Mood normal.       Last Recorded Vitals  Blood pressure 155/71, pulse 82, temperature 37.9 °C (100.2 °F), resp. rate 18, height 1.702 m (5' 7.01\"), weight 58.1 kg (128 lb), SpO2 90 %.  Intake/Output last 3 Shifts:  I/O last 3 completed shifts:  In: 1100 (18.9 mL/kg) [I.V.:600 (10.3 mL/kg); IV Piggyback:500]  Out: 100 (1.7 mL/kg) [Urine:100 (0 mL/kg/hr)]  Dosing Weight: 58.1 kg     Relevant Results  Results for orders placed or performed during the hospital encounter of 10/13/23 (from the past 24 hour(s))   Hepatitis B surface antigen   Result Value Ref Range    Hepatitis B Surface AG Nonreactive Nonreactive   Hepatitis B core antibody, total   Result Value Ref Range    Hepatitis B Core AB- Total Nonreactive Nonreactive   Hepatitis B surface antibody   Result Value Ref Range    Hepatitis B Surface AB 13.9 (H) <10.0 mIU/mL   CBC   Result Value Ref Range    WBC 8.2 4.4 - 11.3 x10*3/uL    nRBC 0.0 0.0 - 0.0 /100 WBCs    RBC 3.52 (L) 4.50 - 5.90 x10*6/uL    Hemoglobin 10.4 (L) 13.5 - 17.5 g/dL    Hematocrit 32.1 (L) 41.0 - 52.0 %    MCV 91 80 - 100 fL    MCH 29.5 26.0 - 34.0 pg    MCHC 32.4 32.0 - 36.0 g/dL    RDW 18.9 (H) 11.5 - 14.5 %    Platelets 98 (L) 150 - 450 x10*3/uL    MPV 10.1 7.5 - 11.5 fL   Comprehensive Metabolic Panel   Result Value Ref Range    Glucose 81 74 - 99 mg/dL    Sodium 136 136 - 145 mmol/L    Potassium " 4.4 3.5 - 5.3 mmol/L    Chloride 100 98 - 107 mmol/L    Bicarbonate 26 21 - 32 mmol/L    Anion Gap 14 10 - 20 mmol/L    Urea Nitrogen 32 (H) 6 - 23 mg/dL    Creatinine 6.82 (H) 0.50 - 1.30 mg/dL    eGFR 9 (L) >60 mL/min/1.73m*2    Calcium 8.1 (L) 8.6 - 10.3 mg/dL    Albumin 3.3 (L) 3.4 - 5.0 g/dL    Alkaline Phosphatase 52 33 - 120 U/L    Total Protein 6.4 6.4 - 8.2 g/dL    AST 13 9 - 39 U/L    Bilirubin, Total 0.4 0.0 - 1.2 mg/dL    ALT 11 10 - 52 U/L     US scrotum  Result Date: 10/16/2023  Interpreted By:  Claus Marie, STUDY: US SCROTUM;  10/16/2023 1:43 pm   INDICATION: Signs/Symptoms:Hx of testicular CA.   COMPARISON: None.   ACCESSION NUMBER(S): [id number]   ORDERING CLINICIAN: MALCOM SINGH   TECHNIQUE: Multiple ultrasonographic images of scrotum and tested were obtained. Color Doppler imaging and waveform analysis was also performed.   FINDINGS: HEMISCROTUM:   Reportedly status post left orchiectomy.   REMAINING, PRESUMED RIGHT TESTICLE: The left testicle measures 4.4 x 3.5 x 1.8 cm.  There are several punctate echogenic foci indicating microlithiasis. The testicular parenchyma otherwise is homogeneous without discrete mass lesion. Normal vascularity and Doppler waveforms are observed in the left testicle.   RIGHT EPIDIDYMIS: The left epididymis measures 1.1 x 0.7 by 0.7 cm. 2 cysts are noted at the epididymal head measuring 3 x 3 and 4 x 5 mm.   Pampiniform plexus: No evidence of varicoceles       Right testicular microlithiasis. Otherwise unremarkable.   Signed by: Claus Marie 10/16/2023 5:11 PM Dictation workstation:   [id number]    CT pelvis wo IV contrast  Result Date: 10/14/2023  Interpreted By:  Kavon Piedra, STUDY: CT PELVIS WO IV CONTRAST;  10/13/2023 11:50 pm   INDICATION: Signs/Symptoms:left groin pain. hx of testicular cancer.   COMPARISON: 8/1/2023   ACCESSION NUMBER(S): [id number]   ORDERING CLINICIAN: MARC CROFT   TECHNIQUE: Contiguous axial images of the pelvis were obtained without  intravenous contrast. Coronal and sagittal reformatted images were obtained from the axial images.   FINDINGS: Limited evaluation the soft tissues secondary to lack of intravenous. Gallstones noted in the gallbladder. No evidence of bowel obstruction.   Urinary bladder is underdistended and not well evaluated. There is however prominent urinary bladder wall thickening and edema.   Limited evaluation of the scrotum.   No evidence acute displaced pelvic fracture. Vascular calcifications.       Urinary bladder wall thickening and edema compatible with cystitis; please clinically correlate with urinalysis.   No evidence of inguinal hernia.   Limited evaluation the scrotum and if there is concern for scrotal or testicular pathology, scrotal ultrasound is recommended for further evaluation.   MACRO: None   Signed by: Kavon Piedra 10/14/2023 12:41 AM Dictation workstation:   [id number]    Assessment/Plan   Principal Problem:    Hypertensive urgency    1. Hx of testicular CA with left groin/hip pain  -s/p left orchiectomy 20+ yrs ago.  -Exam normal.  -Scrotal US with right microlithiasis, otherwise unremarkable.  -Unlikely this is causing previous pain.  -This microlithiasis is a nonspecific finding, but given hx of cancer recommend outpatient F/U.    Laura Holcomb PA-C  [facility]  I'm available via [employer] qing.

## 2023-10-17 NOTE — PROGRESS NOTES
10/17/23 1027   Discharge Planning   Type of Residence Skilled nursing facility   Home or Post Acute Services Post acute facilities (Rehab/SNF/etc)  ([facility])   Patient expects to be discharged to: [facility] SNF   Does the patient need discharge transport arranged? Yes   RoundTrip coordination needed? Yes      [facility] can accept.  Verified with patient [facility] is facility preference.  Will need onsite dialysis set up at facility and precert.             Dina HEALYN, RN  Transitional Care Coordinator.

## 2023-10-17 NOTE — PROGRESS NOTES
10/17/23 1334   Discharge Planning   Type of Post Acute Facility Services Skilled nursing   Patient expects to be discharged to: [facility]   Does the patient need discharge transport arranged? Yes   RoundTrip coordination needed? Yes     1334:  Received response from [facility] onsite dialysis approved.  Updated therapy sent to [facility] to start  precert.

## 2023-10-17 NOTE — CARE PLAN
Problem: PT Problem  Goal: STG - Pt will transfer STS with MOD INDEP  Outcome: Progressing  Goal: STG - Pt will amb >100' using WW with MOD INDEP   Outcome: Progressing  Goal: STG -  Pt will navigate 4 stairs using rail with MOD INDEP   Outcome: Progressing

## 2023-10-18 ENCOUNTER — APPOINTMENT (OUTPATIENT)
Dept: DIALYSIS | Facility: HOSPITAL | Age: 58
End: 2023-10-18
Payer: MEDICAID

## 2023-10-18 ENCOUNTER — APPOINTMENT (OUTPATIENT)
Dept: RADIOLOGY | Facility: HOSPITAL | Age: 58
End: 2023-10-18
Payer: MEDICAID

## 2023-10-18 LAB
ALBUMIN SERPL BCP-MCNC: 3.2 G/DL (ref 3.4–5)
ALP SERPL-CCNC: 52 U/L (ref 33–120)
ALT SERPL W P-5'-P-CCNC: 10 U/L (ref 10–52)
ANION GAP SERPL CALC-SCNC: 15 MMOL/L (ref 10–20)
AST SERPL W P-5'-P-CCNC: 11 U/L (ref 9–39)
BACTERIA BLD CULT: NORMAL
BACTERIA BLD CULT: NORMAL
BILIRUB SERPL-MCNC: 0.4 MG/DL (ref 0–1.2)
BUN SERPL-MCNC: 48 MG/DL (ref 6–23)
CALCIUM SERPL-MCNC: 8.4 MG/DL (ref 8.6–10.3)
CHLORIDE SERPL-SCNC: 99 MMOL/L (ref 98–107)
CO2 SERPL-SCNC: 26 MMOL/L (ref 21–32)
CREAT SERPL-MCNC: 8.64 MG/DL (ref 0.5–1.3)
ERYTHROCYTE [DISTWIDTH] IN BLOOD BY AUTOMATED COUNT: 18.3 % (ref 11.5–14.5)
GFR SERPL CREATININE-BSD FRML MDRD: 7 ML/MIN/1.73M*2
GLUCOSE SERPL-MCNC: 83 MG/DL (ref 74–99)
HCT VFR BLD AUTO: 32 % (ref 41–52)
HGB BLD-MCNC: 10.5 G/DL (ref 13.5–17.5)
MCH RBC QN AUTO: 29.6 PG (ref 26–34)
MCHC RBC AUTO-ENTMCNC: 32.8 G/DL (ref 32–36)
MCV RBC AUTO: 90 FL (ref 80–100)
NRBC BLD-RTO: 0 /100 WBCS (ref 0–0)
PLATELET # BLD AUTO: 115 X10*3/UL (ref 150–450)
PMV BLD AUTO: 10.7 FL (ref 7.5–11.5)
POTASSIUM SERPL-SCNC: 4.9 MMOL/L (ref 3.5–5.3)
PROT SERPL-MCNC: 6.2 G/DL (ref 6.4–8.2)
RBC # BLD AUTO: 3.55 X10*6/UL (ref 4.5–5.9)
SODIUM SERPL-SCNC: 135 MMOL/L (ref 136–145)
WBC # BLD AUTO: 8.1 X10*3/UL (ref 4.4–11.3)

## 2023-10-18 PROCEDURE — 8010000001 HC DIALYSIS - HEMODIALYSIS PER DAY

## 2023-10-18 PROCEDURE — 1200000002 HC GENERAL ROOM WITH TELEMETRY DAILY

## 2023-10-18 PROCEDURE — 2500000004 HC RX 250 GENERAL PHARMACY W/ HCPCS (ALT 636 FOR OP/ED): Performed by: INTERNAL MEDICINE

## 2023-10-18 PROCEDURE — 2500000001 HC RX 250 WO HCPCS SELF ADMINISTERED DRUGS (ALT 637 FOR MEDICARE OP): Performed by: INTERNAL MEDICINE

## 2023-10-18 PROCEDURE — 70450 CT HEAD/BRAIN W/O DYE: CPT

## 2023-10-18 PROCEDURE — 85027 COMPLETE CBC AUTOMATED: CPT | Performed by: NURSE PRACTITIONER

## 2023-10-18 PROCEDURE — 90937 HEMODIALYSIS REPEATED EVAL: CPT

## 2023-10-18 PROCEDURE — 84075 ASSAY ALKALINE PHOSPHATASE: CPT | Performed by: NURSE PRACTITIONER

## 2023-10-18 PROCEDURE — G0378 HOSPITAL OBSERVATION PER HR: HCPCS

## 2023-10-18 PROCEDURE — 70450 CT HEAD/BRAIN W/O DYE: CPT | Performed by: STUDENT IN AN ORGANIZED HEALTH CARE EDUCATION/TRAINING PROGRAM

## 2023-10-18 RX ADMIN — TRAMADOL HYDROCHLORIDE 50 MG: 50 TABLET, COATED ORAL at 21:28

## 2023-10-18 RX ADMIN — MINOXIDIL 10 MG: 10 TABLET ORAL at 21:29

## 2023-10-18 RX ADMIN — HYDRALAZINE HYDROCHLORIDE 100 MG: 50 TABLET ORAL at 08:26

## 2023-10-18 RX ADMIN — GUAIFENESIN 600 MG: 600 TABLET, EXTENDED RELEASE ORAL at 21:28

## 2023-10-18 RX ADMIN — HEPARIN SODIUM 2400 UNITS: 1000 INJECTION INTRAVENOUS; SUBCUTANEOUS at 18:05

## 2023-10-18 RX ADMIN — GABAPENTIN 100 MG: 100 CAPSULE ORAL at 21:27

## 2023-10-18 RX ADMIN — HEPARIN SODIUM 2200 UNITS: 1000 INJECTION INTRAVENOUS; SUBCUTANEOUS at 18:03

## 2023-10-18 RX ADMIN — HYDRALAZINE HYDROCHLORIDE 100 MG: 50 TABLET ORAL at 21:28

## 2023-10-18 RX ADMIN — CARVEDILOL 25 MG: 25 TABLET, FILM COATED ORAL at 08:26

## 2023-10-18 RX ADMIN — CALCIUM ACETATE 1334 MG: 667 CAPSULE ORAL at 08:26

## 2023-10-18 RX ADMIN — MIRTAZAPINE 7.5 MG: 15 TABLET, FILM COATED ORAL at 21:27

## 2023-10-18 RX ADMIN — MINOXIDIL 10 MG: 10 TABLET ORAL at 09:55

## 2023-10-18 RX ADMIN — TRAMADOL HYDROCHLORIDE 50 MG: 50 TABLET, COATED ORAL at 08:27

## 2023-10-18 RX ADMIN — HEPARIN SODIUM 2000 UNITS: 1000 INJECTION INTRAVENOUS; SUBCUTANEOUS at 14:19

## 2023-10-18 RX ADMIN — GUAIFENESIN 600 MG: 600 TABLET, EXTENDED RELEASE ORAL at 08:26

## 2023-10-18 RX ADMIN — AMLODIPINE BESYLATE 10 MG: 10 TABLET ORAL at 08:26

## 2023-10-18 RX ADMIN — CALCIUM ACETATE 1334 MG: 667 CAPSULE ORAL at 21:27

## 2023-10-18 RX ADMIN — ATORVASTATIN CALCIUM 40 MG: 40 TABLET, FILM COATED ORAL at 21:27

## 2023-10-18 ASSESSMENT — COGNITIVE AND FUNCTIONAL STATUS - GENERAL
CLIMB 3 TO 5 STEPS WITH RAILING: A LITTLE
WALKING IN HOSPITAL ROOM: A LITTLE
HELP NEEDED FOR BATHING: A LITTLE
DAILY ACTIVITIY SCORE: 21
MOBILITY SCORE: 20
WALKING IN HOSPITAL ROOM: A LITTLE
DRESSING REGULAR LOWER BODY CLOTHING: A LITTLE
STANDING UP FROM CHAIR USING ARMS: A LITTLE
MOVING TO AND FROM BED TO CHAIR: A LITTLE
STANDING UP FROM CHAIR USING ARMS: A LITTLE
CLIMB 3 TO 5 STEPS WITH RAILING: A LITTLE
DRESSING REGULAR LOWER BODY CLOTHING: A LITTLE
MOVING TO AND FROM BED TO CHAIR: A LITTLE
PERSONAL GROOMING: A LITTLE
MOBILITY SCORE: 20
PERSONAL GROOMING: A LITTLE
DAILY ACTIVITIY SCORE: 21
HELP NEEDED FOR BATHING: A LITTLE

## 2023-10-18 ASSESSMENT — PAIN SCALES - GENERAL
PAINLEVEL_OUTOF10: 0 - NO PAIN
PAINLEVEL_OUTOF10: 7
PAINLEVEL_OUTOF10: 9

## 2023-10-18 ASSESSMENT — PAIN - FUNCTIONAL ASSESSMENT
PAIN_FUNCTIONAL_ASSESSMENT: 0-10
PAIN_FUNCTIONAL_ASSESSMENT: 0-10

## 2023-10-18 ASSESSMENT — PAIN DESCRIPTION - DESCRIPTORS: DESCRIPTORS: ACHING

## 2023-10-18 NOTE — PROGRESS NOTES
10/18/23 0907   Discharge Planning   Type of Post Acute Facility Services Skilled nursing   Patient expects to be discharged to: [facility]   Does the patient need discharge transport arranged? Yes   RoundTrip coordination needed? Yes     0907:  Precert escalated

## 2023-10-18 NOTE — CONSULTS
Nutrition Assessment Note      Name: Ponce Do  ROOM: [id number]/[id number]-A  Nurse:No care team member to display    AGE: 58 y.o.    GENDER: male MRN: [id number]  Code: No Order         Reason for Assessment  Reason for Assessment: Admission nursing screening    Subjective      Energy Intake: Fair 50-75 %  Food and Nutrient History: Pt feels he eats most of his meals well but not some of them.  He is also taking his magic cup most ot the time.  He does drink nepro at times and would like one here       Objective   Per Flowsheet Percent Meal intake: 100  Dietary Orders (From admission, onward)       Start     Ordered    10/18/23 1517  Oral nutritional supplements  Until discontinued        Question Answer Comment   Deliver with Lunch    Select supplement: Nepro With Carbsteady        10/18/23 1516    10/14/23 1300  Oral nutritional supplements  Until discontinued        Question Answer Comment   Deliver with All meals    Select supplement: Magic Cup        10/14/23 1302    10/14/23 1010  May Participate in Room Service  Once        Question:  .  Answer:  Yes    10/14/23 1010    10/14/23 0600  Adult diet Renal; 50 mEq; 2 - 3 grams Sodium; Phosphorus 1 gm  Diet effective now        Question Answer Comment   Diet type Renal    Potassium restriction: 50 mEq    Sodium restriction: 2 - 3 grams Sodium    Phosphorus: Phosphorus 1 gm        10/14/23 0559                  Results from last 7 days   Lab Units 10/18/23  0535 10/17/23  0556 10/16/23  0613 10/15/23  0850 10/15/23  0656 10/13/23  1811   GLUCOSE mg/dL 83 81 71*   < > 76 126*   SODIUM mmol/L 135* 136 137   < > 136 135*   POTASSIUM mmol/L 4.9 4.4 4.8   < > 5.1 4.7   CHLORIDE mmol/L 99 100 96*   < > 95* 99   CO2 mmol/L 26 26 25   < > 28 25   BUN mg/dL 48* 32* 60*   < > 50* 19   CREATININE mg/dL 8.64* 6.82* 10.10*   < > 8.65* 4.66*   EGFR mL/min/1.73m*2 7* 9* 5*   < > 7* 14*   CALCIUM mg/dL 8.4* 8.1* 8.5*   < > 7.8* 8.5*   PHOSPHORUS mg/dL  --   --   --   --  6.3*  --   "  MAGNESIUM mg/dL  --   --   --   --   --  2.04    < > = values in this interval not displayed.     Lab Results   Component Value Date    HGBA1C 4.1 (L) 06/05/2023    HGBA1C 6.6 06/16/2021     Results from last 7 days   Lab Units 10/15/23  1515   POCT GLUCOSE mg/dL 122*     GI per flowsheet:  Gastrointestinal  Gastrointestinal (WDL): Within Defined Limits  Last bowel movement documented:    Past Medical History:   Diagnosis Date    Cancer (CMS/HCC)      Principal Problem:    Hypertensive urgency     Allergies: Patient has no known allergies.     Anthropometrics:  Height: 170.2 cm (5' 7.01\")  Weight: 58.1 kg (128 lb)  BMI (Calculated): 20.04          Daily Weight  10/18/23 : 58.1 kg (128 lb)       i  Total Energy Estimated Needs (kCal):  (1576-4458 with dialysis)  Method for Estimating Needs: MSJ    Total Protein Estimated Needs (g):  (  with dialysis)  Method for Estimating Needs: 1.2-1.5 gm kg of IBW  with dialysis    Total Fluid Estimated Needs (mL):  (7656-4095 as medically indicated)         Nutrition Focused Physical Findings:   Orbital Fat Pads: Defer (pt was getting dialysis)                 Skin:  (please see nursing/wound notes for further details)  Edema:   Pain Score: 9     Diagnosis        Pt has Nutrition Diagnosis: Yes  Diagnosis Status (1): Ongoing  Nutrition Diagnosis 1: Increased nutrient needs  Related to (1): physiological causes  As Evidenced by (1): ESRD  HD     Intervention:  Coordination of Care: Dr Gomez          Education Documentation  No documentation found.          Recommendation(s):  Individualized Nutrition Prescription Provided for : Continue low sodium   low K+ diet  Defer low phosphorus so that it does not limit amount of food patient receives each day,  continue magic cup X 3 per day and adding nepro at lunch to help meet nutritional needs.    Monitoring and Evaluation   Weights  Labs  PO intake            Time Spent (min): 45 minutes  Last Date of Nutrition Visit: " 10/18/23  Nutrition Follow-Up Needed?: 3-5 days  Follow up Comment: 10/23  MZ

## 2023-10-18 NOTE — PROGRESS NOTES
NEPHROLOGY PROGRESS NOTE    REASON FOR CONSULT: ESRD on HD MWF    SUBJECTIVE:    Patient complains of headache.  He also has back pain.  Denies any shortness of breath.  His blood pressure has been running high.  No nausea or vomiting.  He is due for his dialysis today.    OBJECTIVE:    Visit Vitals  /79   Pulse 81   Temp 36.7 °C (98.1 °F)   Resp 14        No intake or output data in the 24 hours ending 10/18/23 1151       General: Awake and Alert, In no distress, Cooperative  HEENT: Oral mucosa moist, EOMI, bandage over the left forehead  NECK: Supple, right IJ TDC noted  CHEST: No crackles, no wheeze, no tachypnea  CVS: S1,S2 heard, no rubs, RRR  ABD: Soft, Non Tender, BS present  EXT: no edema    MEDICATION:    Scheduled medications  amLODIPine, 10 mg, oral, Daily  atorvastatin, 40 mg, oral, Nightly  calcium acetate, 1,334 mg, oral, TID  carvedilol, 25 mg, oral, BID with meals  [Held by provider] clopidogrel, 75 mg, oral, Daily  [Held by provider] enoxaparin, 30 mg, subcutaneous, Daily  gabapentin, 100 mg, oral, Nightly  guaiFENesin, 600 mg, oral, BID  heparin, 2,000 Units, intravenous, After Dialysis  heparin, 2,000 Units, intra-catheter, After Dialysis  heparin, 2,000 Units, intra-catheter, After Dialysis  hydrALAZINE, 100 mg, oral, BID  minoxidil, 10 mg, oral, BID  mirtazapine, 7.5 mg, oral, Nightly      Continuous medications     PRN medications  PRN medications: hydrALAZINE, hydrOXYzine HCL, ipratropium-albuteroL, ondansetron, sodium chloride, traMADol     RESULTS:    Lab Results   Component Value Date    WBC 8.1 10/18/2023    HGB 10.5 (L) 10/18/2023    HCT 32.0 (L) 10/18/2023    MCV 90 10/18/2023     (L) 10/18/2023        Lab Results   Component Value Date    CREATININE 8.64 (H) 10/18/2023    BUN 48 (H) 10/18/2023     (L) 10/18/2023    K 4.9 10/18/2023    CL 99 10/18/2023    CO2 26 10/18/2023        Radiology Imaging reviewed      ASSESSMENT/PLAN:     1.  ESRD on HD MWF: Patient to  undergo dialysis today he appears euvolemic.  Electrolytes are stable. We will dialyze him for 3-1/2 hours with a 2K bath with 2.5 L UF.    2.  Hyperphosphatemia: He is on calcium acetate with meals.    3.  Hypertension: He is on amlodipine Coreg,  minoxidil and hydralazine      Thank You very much for allowing me to participate in the care of this Patient    This document was created using dragon dictation and may contain unintended error    Deni Hamm MD   10/18/23

## 2023-10-18 NOTE — DOCUMENTATION CLARIFICATION NOTE
"    PATIENT:               ANT AMADOR  ACCT #:                  [id number]  MRN:                       [id number]  :                       1965  ADMIT DATE:       10/13/2023 10:46 PM  DISCH DATE:  RESPONDING PROVIDER #:        [id number]          PROVIDER RESPONSE TEXT:    Hyponatremia is ruled out    CDI QUERY TEXT:    [facility]_CV Electrolyte        Instruction:    Based on your assessment of the patient and the clinical information, please provide the requested documentation by clicking on the appropriate radio button and enter any additional information if prompted.    Question: Please clarify the diagnosis of CDI TO ENTER    When answering this query, please exercise your independent professional judgment. The fact that a question is being asked, does not imply that any particular answer is desired or expected.    The patient's clinical indicators include:  Clinical Information: Per H/P 10/14/23 by Dr. Enrique Gomez \"58 y.o. male presenting to emergency department from dialysis for evaluation of hypertension.  Patient was reportedly at dialysis today and remained hypertensive throughout his session.\"    -Documented Diagnosis: Per H/P 10/14/23 by Dr. Enrique Gomez \"hyponatremia\"  -Laboratory Results: Sodium 10/13/23= 135   10/15/23= 136   10/16/23= 137   10/17/23= 136  -Clinical Manifestations/Physical Exam Findings:  Per H/P 10/14/23 by Dr. Enrique Gomez \"He is alert and oriented to person, place, and time. Mental status is at baseline. \"    Treatment: Monitoring labs, Sodium chloride 0.9% IVF on 10/15/23, Sodium chloride 0.9% IV bolus on 10/16/23    Risk Factors: Age, HTN, ESRD on dialysis, DM  Options provided:  -- Hyponatremia clinically significant, as evidenced by and treated with, Please specify additional information below  -- Hyponatremia is ruled out  -- Other - I will add my own diagnosis  -- Refer to Clinical Documentation Reviewer    Query created by: Abigail Park on 10/17/2023 10:11 " AM      Electronically signed by:  MAICOL GARIBAY MD 10/18/2023 1:43 PM

## 2023-10-18 NOTE — DISCHARGE SUMMARY
Discharge Diagnosis  Hypertensive urgency    Issues Requiring Follow-Up  Patient fully evaluated on October 17 and blood pressure under better control.    Discharge Meds     Your medication list        START taking these medications        Instructions Last Dose Given Next Dose Due   carvedilol 25 mg tablet  Commonly known as: Coreg      Take 1 tablet (25 mg) by mouth 2 times a day with meals.       hydrOXYzine HCL 25 mg tablet  Commonly known as: Atarax      Take 1 tablet (25 mg) by mouth every 6 hours if needed for anxiety for up to 10 days.       ipratropium-albuteroL 0.5-2.5 mg/3 mL nebulizer solution  Commonly known as: Duo-Neb      Take 3 mL by nebulization every 2 hours if needed for wheezing.       levETIRAcetam 500 mg tablet  Commonly known as: Keppra      Take 1 tablet (500 mg) by mouth 2 times a day.              CHANGE how you take these medications        Instructions Last Dose Given Next Dose Due   hydrALAZINE 100 mg tablet  Commonly known as: Apresoline  What changed:   medication strength  how much to take  when to take this      Take 1 tablet (100 mg) by mouth 2 times a day.       minoxidil 10 mg tablet  Commonly known as: Loniten  What changed:   medication strength  how much to take  when to take this      Take 1 tablet (10 mg) by mouth 2 times a day.              CONTINUE taking these medications        Instructions Last Dose Given Next Dose Due   amLODIPine 10 mg tablet  Commonly known as: Norvasc           atorvastatin 40 mg tablet  Commonly known as: Lipitor           calcium acetate 667 mg capsule  Commonly known as: Phoslo           clopidogrel 75 mg tablet  Commonly known as: Plavix           gabapentin 100 mg capsule  Commonly known as: Neurontin           guaiFENesin 600 mg 12 hr tablet  Commonly known as: Mucinex           mirtazapine 7.5 mg tablet  Commonly known as: Remeron                     Where to Get Your Medications        These medications were sent to [id number] -  [address]  [address] [facility] [address]      Phone: [phone]   carvedilol 25 mg tablet  hydrALAZINE 100 mg tablet  hydrOXYzine HCL 25 mg tablet  ipratropium-albuteroL 0.5-2.5 mg/3 mL nebulizer solution  levETIRAcetam 500 mg tablet  minoxidil 10 mg tablet         Test Results Pending At Discharge  Pending Labs       Order Current Status    Extra Tubes Preliminary result    Green Top Preliminary result    Lavender Top Preliminary result    Light Blue Top Preliminary result    SST TOP Preliminary result            Hospital Course           Enrique Gomez MD  Physician  Internal Medicine     Progress Notes      Signed     Date of Service: 10/16/2023  4:19 PM     Signed       Expand All Collapse All    Ponce Do is a 58 y.o. male on day 0 of admission presenting with Hypertensive urgency.           Subjective   Patient with fall earlier.  Clinically unchanged.              Objective   Last Recorded Vitals  /88   Pulse 85   Temp 37.1 °C (98.8 °F) (Temporal)   Resp 18   Wt 58.1 kg (128 lb)   SpO2 96%   Intake/Output last 3 Shifts:     Intake/Output Summary (Last 24 hours) at 10/16/2023 1619  Last data filed at 10/16/2023 1205      Gross per 24 hour   Intake 600 ml   Output 100 ml   Net 500 ml         Admission Weight  Weight: 58.1 kg (128 lb) (10/13/23 1736)     Daily Weight  10/13/23 : 58.1 kg (128 lb)     Image Results  CT angio chest for pulmonary embolism  Narrative: Interpreted By:  Timothy Beckford,   STUDY:  CT ANGIO CHEST FOR PULMONARY EMBOLISM;  10/16/2023 2:02 pm      INDICATION:  Signs/Symptoms:r/o pe.      COMPARISON:  CT chest, abdomen and pelvis 08/01/2023      ACCESSION NUMBER(S):  [id number]      ORDERING CLINICIAN:  LUIS M HELLER      TECHNIQUE:  Helical data acquisition of the chest was obtained after  administration of 75ml IV Omnipaque 350. Images were reformatted in  coronal and sagittal planes. Axial and coronal MIP images were  created  and reviewed.      FINDINGS:  POTENTIAL LIMITATIONS OF THE STUDY: None      HEART AND VESSELS:  No discrete filling defects within the main pulmonary artery or its  branches.      Main pulmonary artery and its branches are normal in caliber.      The thoracic aorta is of normal course and caliber without vascular  calcifications.      Severe coronary artery calcifications are seen.The study is not  optimized for evaluation of coronary arteries.      Mild-to-moderate cardiomegaly biatrial enlargement.      Trace pericardial effusion.      MEDIASTINUM AND ELIZABETH, LOWER NECK AND AXILLA:  The visualized thyroid gland is within normal limits.      [place] left-greater-than-right soft tissue hilar  thickening/adenopathy, similar to the chest CT from 08/01/2023. No  mediastinal, axillary supraclavicular nodes.      Small hiatal hernia.      LUNGS AND AIRWAYS:  Central airways are patent without endobronchial lesions. Small  amount of retained secretions.Mild upper lobe predominant  centrilobular and paraseptal emphysema. No focal consolidation. No  pneumothorax. No pleural effusion. Mild bibasilar atelectasis. No  suspicious pulmonary nodule.      UPPER ABDOMEN:  The visualized subdiaphragmatic structures demonstrate no remarkable  findings.      CHEST WALL AND OSSEOUS STRUCTURES:  Right-sided double-lumen catheter with the tip terminating in the  right atrium. There are no suspicious osseous lesions. Multilevel  degenerative changes are present      Impression: 1.  No evidence of pulmonary embolism.  2. Mild upper lobe predominant centrilobular and paraseptal emphysema.  3. [place] left-greater-than-right hilar soft tissue  thickening/adenopathy, similar to the CT chest from 08/01/2023,  likely reactive. Recommend short-term follow-up to document  resolution..          Signed by: Timothy Beckford 10/16/2023 3:24 PM  Dictation workstation:   [id number]        Physical Exam     Relevant Results                        Assessment/Plan   This patient currently has cardiac telemetry ordered; if you would like to modify or discontinue the telemetry order, click hereto go to the orders activity to modify/discontinue the order.                 Principal Problem:    Hypertensive urgency              Enrique Gomez MD  Physician  Internal Medicine     H&P      Addendum     Date of Service: 10/14/2023  5:59 AM      Addendum        Expand All Collapse All    History Of Present Illness  Ponce Do is a 58 y.o. male presenting to emergency department from dialysis for evaluation of hypertension.  Patient was reportedly at dialysis today and remained hypertensive throughout his session.  Patient states he was able to complete his full dialysis session.  Patient was sent into emergency department for evaluation of hypertension.  Patient does report an increase in leg pain.  Patient denies chest pain or dizziness.  Patient denies recent illness, nausea, vomiting, diarrhea.     In ED, chest x-ray completed and negative for acute process per radiology review.  Bilateral lower extremity ultrasound completed and negative for acute process.  CT of the abdomen pelvis completed showing bladder wall thickening and testicular abnormalities concerning for malignancy consistent with patient's history of testicular cancer.  Hemoglobin 12.2, hematocrit 37.2, BNP 1482, troponin 25, glucose 126, sodium 135, GFR 14, creatinine 4.66.  Blood cultures and urine culture pending.  UTI positive.  Patient started on IV ceftriaxone.  Patient medicated with multiple doses of hydralazine.  Blood pressure 182/87, heart rate 82, respirations 18, temperature 36.8 °C, SPO2 98% on room air.  Patient admitted to telemetry observation under the care of Dr. Gomez who will continue to follow.  I was asked to do H&P and place initial admission orders.     Past Medical History  ESRD with hemodialysis [id number], testicular cancer, cerebral infarct with  "hemiplegia (right-sided), hyperkalemia, hypertension, hyponatremia, diabetes, recent pneumonia with recurrent hemoptysis     Surgical History  Bronchoscopy     Social History  He has no history on file for tobacco use, alcohol use, and drug use.     Family History  Reviewed and noncontributory     Allergies  NKDA/NKA     Review of Systems  A 10 point review of systems was completed and negative except what is listed in HPI  Physical Exam  Constitutional:       Appearance: Normal appearance.   HENT:      Mouth/Throat:      Mouth: Mucous membranes are moist.      Pharynx: Oropharynx is clear.   Eyes:      Pupils: Pupils are equal, round, and reactive to light.   Cardiovascular:      Rate and Rhythm: Normal rate and regular rhythm.   Pulmonary:      Effort: Pulmonary effort is normal.      Breath sounds: Normal breath sounds.   Abdominal:      General: Bowel sounds are normal.      Palpations: Abdomen is soft.   Musculoskeletal:         General: Normal range of motion.      Cervical back: Normal range of motion.   Skin:     General: Skin is warm and dry.      Capillary Refill: Capillary refill takes less than 2 seconds.   Neurological:      General: No focal deficit present.      Mental Status: He is alert and oriented to person, place, and time. Mental status is at baseline.   Psychiatric:         Mood and Affect: Mood normal.         Behavior: Behavior normal.            Last Recorded Vitals  Blood pressure (!) 182/87, pulse 82, temperature 37.1 °C (98.8 °F), resp. rate 18, height 1.702 m (5' 7\"), weight 58.1 kg (128 lb), SpO2 98 %.     Relevant Results  Vascular US lower extremity venous duplex bilateral     Result Date: 10/14/2023  Interpreted By:  Ponce Nunes, STUDY: [facility] US LOWER EXTREMITY VENOUS DUPLEX BILATERAL; 10/14/2023 1:25 am   INDICATION: Signs/Symptoms:pain.   COMPARISON: None.   ACCESSION NUMBER(S): [id number]   ORDERING CLINICIAN: MARC CROFT   TECHNIQUE: 2D grayscale and color-flow duplex images " were obtained along with Doppler spectral waveform analysis of the deep venous system of the lower extremity from the groin to the knee. Attempts were made to image the calf veins. Venous compression and augmentation were performed.   FINDINGS: Right Lower Extremity: There is normal compressibility and flow within the visualized vessels of the deep venous system of this lower extremity.   Left Lower Extremity: There is normal compressibility and flow within the visualized vessels of the deep venous system of this lower extremity.          No evidence for deep venous thrombosis within the limits of this examination.   MACRO: None.   Signed by: Ponce Nunes 10/14/2023 1:43 AM Dictation workstation:   [id number]     CT pelvis wo IV contrast     Result Date: 10/14/2023  Interpreted By:  Kavon Piedra, STUDY: CT PELVIS WO IV CONTRAST;  10/13/2023 11:50 pm   INDICATION: Signs/Symptoms:left groin pain. hx of testicular cancer.   COMPARISON: 8/1/2023   ACCESSION NUMBER(S): [id number]   ORDERING CLINICIAN: MARC CROFT   TECHNIQUE: Contiguous axial images of the pelvis were obtained without intravenous contrast. Coronal and sagittal reformatted images were obtained from the axial images.   FINDINGS: Limited evaluation the soft tissues secondary to lack of intravenous. Gallstones noted in the gallbladder. No evidence of bowel obstruction.   Urinary bladder is underdistended and not well evaluated. There is however prominent urinary bladder wall thickening and edema.   Limited evaluation of the scrotum.   No evidence acute displaced pelvic fracture. Vascular calcifications.        Urinary bladder wall thickening and edema compatible with cystitis; please clinically correlate with urinalysis.   No evidence of inguinal hernia.   Limited evaluation the scrotum and if there is concern for scrotal or testicular pathology, scrotal ultrasound is recommended for further evaluation.   MACRO: None   Signed by: Kavon Piedra 10/14/2023  12:41 AM Dictation workstation:   [id number]     XR chest 2 views     Result Date: 10/13/2023  Interpreted By:  Flynn Messer, STUDY: XR CHEST 2 VIEWS;  10/13/2023 5:58 pm   INDICATION: Signs/Symptoms:CP.   COMPARISON: Chest radiograph 08/13/2023   ACCESSION NUMBER(S): [id number]   ORDERING CLINICIAN: ROBIN MCKINLEY   FINDINGS: SUPPORT DEVICES: Right IJ central venous catheter terminates at the cavoatrial junction. Metallic device overlies the right heart, unchanged from prior exams, possibly reflecting a interatrial septal occlusion device.   CARDIOMEDIASTINAL SILHOUETTE: Cardiomediastinal silhouette is normal in size and configuration.   LUNGS: No pulmonary consolidation, pleural effusion or pneumothorax.   ABDOMEN: No remarkable upper abdominal findings.   BONES: No acute osseous abnormality.        1. No acute cardiopulmonary process.     Signed by: Flynn Messer 10/13/2023 6:33 PM Dictation workstation:   [id number]                 Results for orders placed or performed during the hospital encounter of 10/13/23 (from the past 24 hour(s))   Troponin I, High Sensitivity   Result Value Ref Range     Troponin I, High Sensitivity 25 (H) 0 - 20 ng/L   Magnesium   Result Value Ref Range     Magnesium 2.04 1.60 - 2.40 mg/dL   Comprehensive Metabolic Panel   Result Value Ref Range     Glucose 126 (H) 74 - 99 mg/dL     Sodium 135 (L) 136 - 145 mmol/L     Potassium 4.7 3.5 - 5.3 mmol/L     Chloride 99 98 - 107 mmol/L     Bicarbonate 25 21 - 32 mmol/L     Anion Gap 16 10 - 20 mmol/L     Urea Nitrogen 19 6 - 23 mg/dL     Creatinine 4.66 (H) 0.50 - 1.30 mg/dL     eGFR 14 (L) >60 mL/min/1.73m*2     Calcium 8.5 (L) 8.6 - 10.3 mg/dL     Albumin 4.0 3.4 - 5.0 g/dL     Alkaline Phosphatase 54 33 - 120 U/L     Total Protein 7.8 6.4 - 8.2 g/dL     AST 25 9 - 39 U/L     Bilirubin, Total 0.4 0.0 - 1.2 mg/dL     ALT 16 10 - 52 U/L   CBC and Auto Differential   Result Value Ref Range     WBC 8.6 4.4 - 11.3 x10*3/uL     nRBC 0.0  0.0 - 0.0 /100 WBCs     RBC 4.10 (L) 4.50 - 5.90 x10*6/uL     Hemoglobin 12.2 (L) 13.5 - 17.5 g/dL     Hematocrit 37.2 (L) 41.0 - 52.0 %     MCV 91 80 - 100 fL     MCH 29.8 26.0 - 34.0 pg     MCHC 32.8 32.0 - 36.0 g/dL     RDW 18.6 (H) 11.5 - 14.5 %     Platelets 102 (L) 150 - 450 x10*3/uL     MPV 10.8 7.5 - 11.5 fL     Neutrophils % 74.6 40.0 - 80.0 %     Immature Granulocytes %, Automated 0.7 0.0 - 0.9 %     Lymphocytes % 16.6 13.0 - 44.0 %     Monocytes % 4.7 2.0 - 10.0 %     Eosinophils % 2.9 0.0 - 6.0 %     Basophils % 0.5 0.0 - 2.0 %     Neutrophils Absolute 6.41 1.20 - 7.70 x10*3/uL     Immature Granulocytes Absolute, Automated 0.06 0.00 - 0.70 x10*3/uL     Lymphocytes Absolute 1.42 1.20 - 4.80 x10*3/uL     Monocytes Absolute 0.40 0.10 - 1.00 x10*3/uL     Eosinophils Absolute 0.25 0.00 - 0.70 x10*3/uL     Basophils Absolute 0.04 0.00 - 0.10 x10*3/uL   B-type natriuretic peptide   Result Value Ref Range     BNP 1,482 (H) 0 - 99 pg/mL   Urinalysis with Reflex Microscopic and Culture   Result Value Ref Range     Color, Urine Straw Straw, Yellow     Appearance, Urine Clear Clear     Specific Gravity, Urine 1.008 1.005 - 1.035     pH, Urine 9.0 (N) 5.0, 5.5, 6.0, 6.5, 7.0, 7.5, 8.0     Protein, Urine >=500 (3+) (N) NEGATIVE mg/dL     Glucose, Urine 150 (2+) (A) NEGATIVE mg/dL     Blood, Urine NEGATIVE NEGATIVE     Ketones, Urine NEGATIVE NEGATIVE mg/dL     Bilirubin, Urine NEGATIVE NEGATIVE     Urobilinogen, Urine <2.0 <2.0 mg/dL     Nitrite, Urine NEGATIVE NEGATIVE     Leukocyte Esterase, Urine TRACE (A) NEGATIVE   Extra Urine Gray Tube   Result Value Ref Range     Extra Tube Hold for add-ons.     Urinalysis Microscopic Only   Result Value Ref Range     WBC, Urine 11-20 (A) 1-5, NONE /HPF     RBC, Urine 3-5 NONE, 1-2, 3-5 /HPF            Assessment/Plan   Ponce is a 58-year-old male patient presenting to emergency department from hemodialysis today with complaint of elevated blood pressure.  Patient states he  remained hypertensive throughout his whole dialysis session but was able to complete the session.  Patient medicated with multiple doses of hydralazine in ED, current blood pressure 182/87.  BNP 1482, troponin 25.  Labs consistent with ESRD.  Patient found to have a UTI and started on IV ceftriaxone.  Imaging showing bladder wall thickening but negative for acute process per radiology review.  Patient medicated for pain and admitted for further medical management.  Hypertensive urgency  Admit to telemetry observation per Dr. Gomez  See imaging results above  Hydralazine IV x2 in ED  Metoprolol IV x1 as needed  Telemetry monitoring  Resume home medications when med rec is complete  Morphine as needed/Zofran as needed  Trend troponin     2.  ESRD/HTN/hyponatremia/hyper kalemia/diabetes/testicular cancer     Renal diet  Continue home medications when med rec is complete     3.  DVT Ppx  SCDs  No chemical prophylaxis 2/2 recurrent hemoptysis  Activity as tolerated     I spent 20 minutes in the professional and overall care of this patient.        Brie Hameed, APRN-CNP  Patient fully evaluated and plan as above.               Revision History    Patient fully evaluated on October 15.  Continue to monitor blood pressure.  Correct electrolytes.  Appreciate urologic and nephrology consultations.  Patient fully evaluated on October 16.  Patient with questionable seizure activity and EEG is pending.  Patient started on Keppra.  Neurologic examination without focus today.  Dialyze patient and recheck labs in AM.           Enrique Gomez MD                         Pertinent Physical Exam At Time of Discharge  Physical Exam    Outpatient Follow-Up  No future appointments.    Patient fully evaluated on October 17.  No further seizure activity.  Patient cleared for discharge to ECF when certified.    Patient fully evaluated on October 18.  No further neurologic changes noted.  Work-up was negative to date.  Patient  discharged back to an ECF.  Enrique Gomez MD

## 2023-10-19 LAB
ERYTHROCYTE [DISTWIDTH] IN BLOOD BY AUTOMATED COUNT: 18.2 % (ref 11.5–14.5)
HCT VFR BLD AUTO: 32.2 % (ref 41–52)
HGB BLD-MCNC: 10.4 G/DL (ref 13.5–17.5)
MCH RBC QN AUTO: 29.3 PG (ref 26–34)
MCHC RBC AUTO-ENTMCNC: 32.3 G/DL (ref 32–36)
MCV RBC AUTO: 91 FL (ref 80–100)
NRBC BLD-RTO: 0 /100 WBCS (ref 0–0)
PLATELET # BLD AUTO: 102 X10*3/UL (ref 150–450)
PMV BLD AUTO: 9.6 FL (ref 7.5–11.5)
RBC # BLD AUTO: 3.55 X10*6/UL (ref 4.5–5.9)
WBC # BLD AUTO: 7.4 X10*3/UL (ref 4.4–11.3)

## 2023-10-19 PROCEDURE — 2500000001 HC RX 250 WO HCPCS SELF ADMINISTERED DRUGS (ALT 637 FOR MEDICARE OP): Performed by: INTERNAL MEDICINE

## 2023-10-19 PROCEDURE — 97116 GAIT TRAINING THERAPY: CPT | Mod: GP,CQ

## 2023-10-19 PROCEDURE — G0378 HOSPITAL OBSERVATION PER HR: HCPCS

## 2023-10-19 PROCEDURE — 36415 COLL VENOUS BLD VENIPUNCTURE: CPT | Performed by: NURSE PRACTITIONER

## 2023-10-19 PROCEDURE — 85027 COMPLETE CBC AUTOMATED: CPT | Performed by: NURSE PRACTITIONER

## 2023-10-19 PROCEDURE — 1200000002 HC GENERAL ROOM WITH TELEMETRY DAILY

## 2023-10-19 PROCEDURE — 2500000004 HC RX 250 GENERAL PHARMACY W/ HCPCS (ALT 636 FOR OP/ED): Performed by: INTERNAL MEDICINE

## 2023-10-19 RX ADMIN — GUAIFENESIN 600 MG: 600 TABLET, EXTENDED RELEASE ORAL at 20:48

## 2023-10-19 RX ADMIN — MINOXIDIL 10 MG: 10 TABLET ORAL at 20:48

## 2023-10-19 RX ADMIN — GABAPENTIN 100 MG: 100 CAPSULE ORAL at 20:48

## 2023-10-19 RX ADMIN — MINOXIDIL 10 MG: 10 TABLET ORAL at 09:48

## 2023-10-19 RX ADMIN — CARVEDILOL 25 MG: 25 TABLET, FILM COATED ORAL at 09:48

## 2023-10-19 RX ADMIN — CALCIUM ACETATE 1334 MG: 667 CAPSULE ORAL at 15:58

## 2023-10-19 RX ADMIN — AMLODIPINE BESYLATE 10 MG: 10 TABLET ORAL at 09:47

## 2023-10-19 RX ADMIN — CALCIUM ACETATE 1334 MG: 667 CAPSULE ORAL at 09:47

## 2023-10-19 RX ADMIN — ATORVASTATIN CALCIUM 40 MG: 40 TABLET, FILM COATED ORAL at 20:48

## 2023-10-19 RX ADMIN — MIRTAZAPINE 7.5 MG: 15 TABLET, FILM COATED ORAL at 20:47

## 2023-10-19 RX ADMIN — CARVEDILOL 25 MG: 25 TABLET, FILM COATED ORAL at 17:14

## 2023-10-19 RX ADMIN — TRAMADOL HYDROCHLORIDE 50 MG: 50 TABLET, COATED ORAL at 15:58

## 2023-10-19 RX ADMIN — GUAIFENESIN 600 MG: 600 TABLET, EXTENDED RELEASE ORAL at 09:47

## 2023-10-19 RX ADMIN — CALCIUM ACETATE 1334 MG: 667 CAPSULE ORAL at 20:48

## 2023-10-19 RX ADMIN — HYDRALAZINE HYDROCHLORIDE 100 MG: 50 TABLET ORAL at 09:47

## 2023-10-19 RX ADMIN — HYDRALAZINE HYDROCHLORIDE 100 MG: 50 TABLET ORAL at 20:48

## 2023-10-19 ASSESSMENT — COGNITIVE AND FUNCTIONAL STATUS - GENERAL
WALKING IN HOSPITAL ROOM: A LITTLE
MOBILITY SCORE: 20
HELP NEEDED FOR BATHING: A LITTLE
CLIMB 3 TO 5 STEPS WITH RAILING: A LITTLE
MOVING TO AND FROM BED TO CHAIR: A LITTLE
STANDING UP FROM CHAIR USING ARMS: A LITTLE
DRESSING REGULAR LOWER BODY CLOTHING: A LITTLE
WALKING IN HOSPITAL ROOM: A LITTLE
DAILY ACTIVITIY SCORE: 21
MOVING TO AND FROM BED TO CHAIR: A LITTLE
CLIMB 3 TO 5 STEPS WITH RAILING: A LITTLE
PERSONAL GROOMING: A LITTLE
MOBILITY SCORE: 20
STANDING UP FROM CHAIR USING ARMS: A LITTLE

## 2023-10-19 ASSESSMENT — PAIN SCALES - GENERAL
PAINLEVEL_OUTOF10: 6
PAINLEVEL_OUTOF10: 0 - NO PAIN
PAINLEVEL_OUTOF10: 0 - NO PAIN

## 2023-10-19 ASSESSMENT — PAIN - FUNCTIONAL ASSESSMENT
PAIN_FUNCTIONAL_ASSESSMENT: 0-10
PAIN_FUNCTIONAL_ASSESSMENT: 0-10

## 2023-10-19 NOTE — PROGRESS NOTES
Physical Therapy    Physical Therapy Treatment    Patient Name: Ponce Do  MRN: [id number]  Today's Date: 10/19/2023  Time Calculation  Start Time: 0909  Stop Time: 0924  Time Calculation (min): 15 min       Assessment/Plan   PT Assessment  PT Assessment Results: Decreased mobility, Impaired balance  Rehab Prognosis: Good  Evaluation/Treatment Tolerance: Patient limited by fatigue, Patient limited by pain  End of Session Communication: Bedside nurse  End of Session Patient Position: Bed, 2 rail up, Alarm on     PT Plan  Treatment/Interventions: Transfer training, Gait training, Stair training, Balance training  PT Plan: Skilled PT  PT Frequency: 2 times per week  PT Discharge Recommendations: Low intensity level of continued care      General Visit Information:   PT  Visit  PT Received On: 10/19/23  General  Prior to Session Communication: Bedside nurse  Patient Position Received: Bed, 2 rail up, Alarm on    Subjective   Precautions:  Precautions  Medical Precautions: Fall precautions       Objective   Pain:  Pain Assessment  Pain Assessment:  (pt c/o pain back, groin, and head post tx session; asking for pain meds.  communicated with RN.)     Treatments:  Bed Mobility  Bed Mobility:  (sup <-> sit independent)    Ambulation/Gait Training  Ambulation/Gait Training Performed:  (pt ambulates >300 ft with FWW and SBA/CGA.  somewhat unsteady at times; one episode forward LOB with pt able to self correct.  one standing rest break around long term point of ambulation.)    Transfers  Transfer:  (sit <-> stand with FWW and SBA)    Outcome Measures:  [facility] Basic Mobility  Turning from your back to your side while in a flat bed without using bedrails: None  Moving from lying on your back to sitting on the side of a flat bed without using bedrails: None  Moving to and from bed to chair (including a wheelchair): A little  Standing up from a chair using your arms (e.g. wheelchair or bedside chair): A little  To walk in hospital  room: A little  Climbing 3-5 steps with railing: A little  Basic Mobility - Total Score: 20    Education Documentation  Mobility Training, taught by Milagro Taylor PTA at 10/19/2023 11:40 AM.  Learner: Patient  Readiness: Acceptance  Method: Explanation  Response: Verbalizes Understanding, Demonstrated Understanding, Needs Reinforcement    Education Comments  No comments found.        EDUCATION:       Encounter Problems       Encounter Problems (Active)       PT Problem       STG - Pt will transfer STS with MOD INDEP (Progressing)       Start:  10/17/23    Expected End:  10/31/23            STG - Pt will amb >100' using WW with MOD INDEP  (Progressing)       Start:  10/17/23    Expected End:  10/31/23            STG -  Pt will navigate 4 stairs using rail with MOD INDEP  (Progressing)       Start:  10/17/23    Expected End:  10/31/23

## 2023-10-19 NOTE — CARE PLAN
The patient's goals for the shift include assess pain and offer medications as needed.     The clinical goals for the shift include pain management      Problem: Fall/Injury  Goal: Not fall by end of shift  Outcome: Progressing  Goal: Be free from injury by end of the shift  Outcome: Progressing     Problem: Pain  Goal: Walks with improved pain control throughout the shift  Outcome: Progressing  Goal: Performs ADL's with improved pain control throughout shift  Outcome: Progressing  Goal: Free from acute confusion related to pain meds throughout the shift  Outcome: Progressing     Problem: Pain - Adult  Goal: Verbalizes/displays adequate comfort level or baseline comfort level  Outcome: Progressing     Problem: Skin  Goal: Decreased wound size/increased tissue granulation at next dressing change  Outcome: Progressing  Flowsheets (Taken 10/18/2023 1150 by Mamie Stern RN)  Decreased wound size/increased tissue granulation at next dressing change: Promote sleep for wound healing  Goal: Prevent/manage excess moisture  Outcome: Progressing  Flowsheets (Taken 10/19/2023 0243)  Prevent/manage excess moisture:   Cleanse incontinence/protect with barrier cream   Monitor for/manage infection if present  Goal: Promote skin healing  Outcome: Progressing  Flowsheets (Taken 10/19/2023 0243)  Promote skin healing: Turn/reposition every 2 hours/use positioning/transfer devices

## 2023-10-19 NOTE — DISCHARGE SUMMARY
Discharge Diagnosis  Hypertensive urgency    Issues Requiring Follow-Up  Patient fully evaluated on October 17 and blood pressure under better control.    Discharge Meds     Your medication list        START taking these medications        Instructions Last Dose Given Next Dose Due   carvedilol 25 mg tablet  Commonly known as: Coreg      Take 1 tablet (25 mg) by mouth 2 times a day with meals.       hydrOXYzine HCL 25 mg tablet  Commonly known as: Atarax      Take 1 tablet (25 mg) by mouth every 6 hours if needed for anxiety for up to 10 days.       ipratropium-albuteroL 0.5-2.5 mg/3 mL nebulizer solution  Commonly known as: Duo-Neb      Take 3 mL by nebulization every 2 hours if needed for wheezing.       levETIRAcetam 500 mg tablet  Commonly known as: Keppra      Take 1 tablet (500 mg) by mouth 2 times a day.              CHANGE how you take these medications        Instructions Last Dose Given Next Dose Due   hydrALAZINE 100 mg tablet  Commonly known as: Apresoline  What changed:   medication strength  how much to take  when to take this      Take 1 tablet (100 mg) by mouth 2 times a day.       minoxidil 10 mg tablet  Commonly known as: Loniten  What changed:   medication strength  how much to take  when to take this      Take 1 tablet (10 mg) by mouth 2 times a day.              CONTINUE taking these medications        Instructions Last Dose Given Next Dose Due   amLODIPine 10 mg tablet  Commonly known as: Norvasc           atorvastatin 40 mg tablet  Commonly known as: Lipitor           calcium acetate 667 mg capsule  Commonly known as: Phoslo           clopidogrel 75 mg tablet  Commonly known as: Plavix           gabapentin 100 mg capsule  Commonly known as: Neurontin           guaiFENesin 600 mg 12 hr tablet  Commonly known as: Mucinex           mirtazapine 7.5 mg tablet  Commonly known as: Remeron                     Where to Get Your Medications        These medications were sent to [id number] -  [address]  [address] [facility] [address]      Phone: [phone]   carvedilol 25 mg tablet  hydrALAZINE 100 mg tablet  hydrOXYzine HCL 25 mg tablet  ipratropium-albuteroL 0.5-2.5 mg/3 mL nebulizer solution  levETIRAcetam 500 mg tablet  minoxidil 10 mg tablet         Test Results Pending At Discharge  Pending Labs       Order Current Status    Extra Tubes Preliminary result    Extra Tubes Preliminary result    Green Top Preliminary result    Lavender Top Preliminary result    Light Blue Top Preliminary result    SST TOP Preliminary result    SST TOP Preliminary result            Hospital Course           Enrique Gomez MD  Physician  Internal Medicine     Progress Notes      Signed     Date of Service: 10/16/2023  4:19 PM     Signed       Expand All Collapse All    Ponce Do is a 58 y.o. male on day 0 of admission presenting with Hypertensive urgency.           Subjective   Patient with fall earlier.  Clinically unchanged.              Objective   Last Recorded Vitals  /88   Pulse 85   Temp 37.1 °C (98.8 °F) (Temporal)   Resp 18   Wt 58.1 kg (128 lb)   SpO2 96%   Intake/Output last 3 Shifts:     Intake/Output Summary (Last 24 hours) at 10/16/2023 1619  Last data filed at 10/16/2023 1205      Gross per 24 hour   Intake 600 ml   Output 100 ml   Net 500 ml         Admission Weight  Weight: 58.1 kg (128 lb) (10/13/23 1736)     Daily Weight  10/13/23 : 58.1 kg (128 lb)     Image Results  CT angio chest for pulmonary embolism  Narrative: Interpreted By:  Timothy Beckford,   STUDY:  CT ANGIO CHEST FOR PULMONARY EMBOLISM;  10/16/2023 2:02 pm      INDICATION:  Signs/Symptoms:r/o pe.      COMPARISON:  CT chest, abdomen and pelvis 08/01/2023      ACCESSION NUMBER(S):  [id number]      ORDERING CLINICIAN:  LUIS M HELLER      TECHNIQUE:  Helical data acquisition of the chest was obtained after  administration of 75ml IV Omnipaque 350. Images were reformatted in  coronal  and sagittal planes. Axial and coronal MIP images were  created and reviewed.      FINDINGS:  POTENTIAL LIMITATIONS OF THE STUDY: None      HEART AND VESSELS:  No discrete filling defects within the main pulmonary artery or its  branches.      Main pulmonary artery and its branches are normal in caliber.      The thoracic aorta is of normal course and caliber without vascular  calcifications.      Severe coronary artery calcifications are seen.The study is not  optimized for evaluation of coronary arteries.      Mild-to-moderate cardiomegaly biatrial enlargement.      Trace pericardial effusion.      MEDIASTINUM AND ELIZABETH, LOWER NECK AND AXILLA:  The visualized thyroid gland is within normal limits.      Hyden left-greater-than-right soft tissue hilar  thickening/adenopathy, similar to the chest CT from 08/01/2023. No  mediastinal, axillary supraclavicular nodes.      Small hiatal hernia.      LUNGS AND AIRWAYS:  Central airways are patent without endobronchial lesions. Small  amount of retained secretions.Mild upper lobe predominant  centrilobular and paraseptal emphysema. No focal consolidation. No  pneumothorax. No pleural effusion. Mild bibasilar atelectasis. No  suspicious pulmonary nodule.      UPPER ABDOMEN:  The visualized subdiaphragmatic structures demonstrate no remarkable  findings.      CHEST WALL AND OSSEOUS STRUCTURES:  Right-sided double-lumen catheter with the tip terminating in the  right atrium. There are no suspicious osseous lesions. Multilevel  degenerative changes are present      Impression: 1.  No evidence of pulmonary embolism.  2. Mild upper lobe predominant centrilobular and paraseptal emphysema.  3. Hyden left-greater-than-right hilar soft tissue  thickening/adenopathy, similar to the CT chest from 08/01/2023,  likely reactive. Recommend short-term follow-up to document  resolution..          Signed by: Timothy Beckford 10/16/2023 3:24 PM  Dictation workstation:   [id number]         Physical Exam     Relevant Results                       Assessment/Plan   This patient currently has cardiac telemetry ordered; if you would like to modify or discontinue the telemetry order, click hereto go to the orders activity to modify/discontinue the order.                 Principal Problem:    Hypertensive urgency              Enrique Gomez MD  Physician  Internal Medicine     H&P      Addendum     Date of Service: 10/14/2023  5:59 AM      Addendum        Expand All Collapse All    History Of Present Illness  Ponce Do is a 58 y.o. male presenting to emergency department from dialysis for evaluation of hypertension.  Patient was reportedly at dialysis today and remained hypertensive throughout his session.  Patient states he was able to complete his full dialysis session.  Patient was sent into emergency department for evaluation of hypertension.  Patient does report an increase in leg pain.  Patient denies chest pain or dizziness.  Patient denies recent illness, nausea, vomiting, diarrhea.     In ED, chest x-ray completed and negative for acute process per radiology review.  Bilateral lower extremity ultrasound completed and negative for acute process.  CT of the abdomen pelvis completed showing bladder wall thickening and testicular abnormalities concerning for malignancy consistent with patient's history of testicular cancer.  Hemoglobin 12.2, hematocrit 37.2, BNP 1482, troponin 25, glucose 126, sodium 135, GFR 14, creatinine 4.66.  Blood cultures and urine culture pending.  UTI positive.  Patient started on IV ceftriaxone.  Patient medicated with multiple doses of hydralazine.  Blood pressure 182/87, heart rate 82, respirations 18, temperature 36.8 °C, SPO2 98% on room air.  Patient admitted to telemetry observation under the care of Dr. Gomez who will continue to follow.  I was asked to do H&P and place initial admission orders.     Past Medical History  ESRD with hemodialysis  "[facility], testicular cancer, cerebral infarct with hemiplegia (right-sided), hyperkalemia, hypertension, hyponatremia, diabetes, recent pneumonia with recurrent hemoptysis     Surgical History  Bronchoscopy     Social History  He has no history on file for tobacco use, alcohol use, and drug use.     Family History  Reviewed and noncontributory     Allergies  NKDA/NKA     Review of Systems  A 10 point review of systems was completed and negative except what is listed in HPI  Physical Exam  Constitutional:       Appearance: Normal appearance.   HENT:      Mouth/Throat:      Mouth: Mucous membranes are moist.      Pharynx: Oropharynx is clear.   Eyes:      Pupils: Pupils are equal, round, and reactive to light.   Cardiovascular:      Rate and Rhythm: Normal rate and regular rhythm.   Pulmonary:      Effort: Pulmonary effort is normal.      Breath sounds: Normal breath sounds.   Abdominal:      General: Bowel sounds are normal.      Palpations: Abdomen is soft.   Musculoskeletal:         General: Normal range of motion.      Cervical back: Normal range of motion.   Skin:     General: Skin is warm and dry.      Capillary Refill: Capillary refill takes less than 2 seconds.   Neurological:      General: No focal deficit present.      Mental Status: He is alert and oriented to person, place, and time. Mental status is at baseline.   Psychiatric:         Mood and Affect: Mood normal.         Behavior: Behavior normal.            Last Recorded Vitals  Blood pressure (!) 182/87, pulse 82, temperature 37.1 °C (98.8 °F), resp. rate 18, height 1.702 m (5' 7\"), weight 58.1 kg (128 lb), SpO2 98 %.     Relevant Results  Vascular US lower extremity venous duplex bilateral     Result Date: 10/14/2023  Interpreted By:  Ponce Nunes, STUDY: [facility] US LOWER EXTREMITY VENOUS DUPLEX BILATERAL; 10/14/2023 1:25 am   INDICATION: Signs/Symptoms:pain.   COMPARISON: None.   ACCESSION NUMBER(S): [id number]   ORDERING CLINICIAN: " MARC CROFT   TECHNIQUE: 2D grayscale and color-flow duplex images were obtained along with Doppler spectral waveform analysis of the deep venous system of the lower extremity from the groin to the knee. Attempts were made to image the calf veins. Venous compression and augmentation were performed.   FINDINGS: Right Lower Extremity: There is normal compressibility and flow within the visualized vessels of the deep venous system of this lower extremity.   Left Lower Extremity: There is normal compressibility and flow within the visualized vessels of the deep venous system of this lower extremity.          No evidence for deep venous thrombosis within the limits of this examination.   MACRO: None.   Signed by: Ponce Nunes 10/14/2023 1:43 AM Dictation workstation:   [id number]     CT pelvis wo IV contrast     Result Date: 10/14/2023  Interpreted By:  Kavon Piedra, STUDY: CT PELVIS WO IV CONTRAST;  10/13/2023 11:50 pm   INDICATION: Signs/Symptoms:left groin pain. hx of testicular cancer.   COMPARISON: 8/1/2023   ACCESSION NUMBER(S): [id number]   ORDERING CLINICIAN: MARC CROFT   TECHNIQUE: Contiguous axial images of the pelvis were obtained without intravenous contrast. Coronal and sagittal reformatted images were obtained from the axial images.   FINDINGS: Limited evaluation the soft tissues secondary to lack of intravenous. Gallstones noted in the gallbladder. No evidence of bowel obstruction.   Urinary bladder is underdistended and not well evaluated. There is however prominent urinary bladder wall thickening and edema.   Limited evaluation of the scrotum.   No evidence acute displaced pelvic fracture. Vascular calcifications.        Urinary bladder wall thickening and edema compatible with cystitis; please clinically correlate with urinalysis.   No evidence of inguinal hernia.   Limited evaluation the scrotum and if there is concern for scrotal or testicular pathology, scrotal ultrasound is recommended for  further evaluation.   MACRO: None   Signed by: Kavon Piedra 10/14/2023 12:41 AM Dictation workstation:   [id number]     XR chest 2 views     Result Date: 10/13/2023  Interpreted By:  Flynn Messer, STUDY: XR CHEST 2 VIEWS;  10/13/2023 5:58 pm   INDICATION: Signs/Symptoms:CP.   COMPARISON: Chest radiograph 08/13/2023   ACCESSION NUMBER(S): [id number]   ORDERING CLINICIAN: ROBIN MCKINLEY   FINDINGS: SUPPORT DEVICES: Right IJ central venous catheter terminates at the cavoatrial junction. Metallic device overlies the right heart, unchanged from prior exams, possibly reflecting a interatrial septal occlusion device.   CARDIOMEDIASTINAL SILHOUETTE: Cardiomediastinal silhouette is normal in size and configuration.   LUNGS: No pulmonary consolidation, pleural effusion or pneumothorax.   ABDOMEN: No remarkable upper abdominal findings.   BONES: No acute osseous abnormality.        1. No acute cardiopulmonary process.     Signed by: Flynn Messer 10/13/2023 6:33 PM Dictation workstation:   [id number]                 Results for orders placed or performed during the hospital encounter of 10/13/23 (from the past 24 hour(s))   Troponin I, High Sensitivity   Result Value Ref Range     Troponin I, High Sensitivity 25 (H) 0 - 20 ng/L   Magnesium   Result Value Ref Range     Magnesium 2.04 1.60 - 2.40 mg/dL   Comprehensive Metabolic Panel   Result Value Ref Range     Glucose 126 (H) 74 - 99 mg/dL     Sodium 135 (L) 136 - 145 mmol/L     Potassium 4.7 3.5 - 5.3 mmol/L     Chloride 99 98 - 107 mmol/L     Bicarbonate 25 21 - 32 mmol/L     Anion Gap 16 10 - 20 mmol/L     Urea Nitrogen 19 6 - 23 mg/dL     Creatinine 4.66 (H) 0.50 - 1.30 mg/dL     eGFR 14 (L) >60 mL/min/1.73m*2     Calcium 8.5 (L) 8.6 - 10.3 mg/dL     Albumin 4.0 3.4 - 5.0 g/dL     Alkaline Phosphatase 54 33 - 120 U/L     Total Protein 7.8 6.4 - 8.2 g/dL     AST 25 9 - 39 U/L     Bilirubin, Total 0.4 0.0 - 1.2 mg/dL     ALT 16 10 - 52 U/L   CBC and Auto Differential    Result Value Ref Range     WBC 8.6 4.4 - 11.3 x10*3/uL     nRBC 0.0 0.0 - 0.0 /100 WBCs     RBC 4.10 (L) 4.50 - 5.90 x10*6/uL     Hemoglobin 12.2 (L) 13.5 - 17.5 g/dL     Hematocrit 37.2 (L) 41.0 - 52.0 %     MCV 91 80 - 100 fL     MCH 29.8 26.0 - 34.0 pg     MCHC 32.8 32.0 - 36.0 g/dL     RDW 18.6 (H) 11.5 - 14.5 %     Platelets 102 (L) 150 - 450 x10*3/uL     MPV 10.8 7.5 - 11.5 fL     Neutrophils % 74.6 40.0 - 80.0 %     Immature Granulocytes %, Automated 0.7 0.0 - 0.9 %     Lymphocytes % 16.6 13.0 - 44.0 %     Monocytes % 4.7 2.0 - 10.0 %     Eosinophils % 2.9 0.0 - 6.0 %     Basophils % 0.5 0.0 - 2.0 %     Neutrophils Absolute 6.41 1.20 - 7.70 x10*3/uL     Immature Granulocytes Absolute, Automated 0.06 0.00 - 0.70 x10*3/uL     Lymphocytes Absolute 1.42 1.20 - 4.80 x10*3/uL     Monocytes Absolute 0.40 0.10 - 1.00 x10*3/uL     Eosinophils Absolute 0.25 0.00 - 0.70 x10*3/uL     Basophils Absolute 0.04 0.00 - 0.10 x10*3/uL   B-type natriuretic peptide   Result Value Ref Range     BNP 1,482 (H) 0 - 99 pg/mL   Urinalysis with Reflex Microscopic and Culture   Result Value Ref Range     Color, Urine Straw Straw, Yellow     Appearance, Urine Clear Clear     Specific Gravity, Urine 1.008 1.005 - 1.035     pH, Urine 9.0 (N) 5.0, 5.5, 6.0, 6.5, 7.0, 7.5, 8.0     Protein, Urine >=500 (3+) (N) NEGATIVE mg/dL     Glucose, Urine 150 (2+) (A) NEGATIVE mg/dL     Blood, Urine NEGATIVE NEGATIVE     Ketones, Urine NEGATIVE NEGATIVE mg/dL     Bilirubin, Urine NEGATIVE NEGATIVE     Urobilinogen, Urine <2.0 <2.0 mg/dL     Nitrite, Urine NEGATIVE NEGATIVE     Leukocyte Esterase, Urine TRACE (A) NEGATIVE   Extra Urine Gray Tube   Result Value Ref Range     Extra Tube Hold for add-ons.     Urinalysis Microscopic Only   Result Value Ref Range     WBC, Urine 11-20 (A) 1-5, NONE /HPF     RBC, Urine 3-5 NONE, 1-2, 3-5 /HPF            Assessment/Plan   Ponce is a 58-year-old male patient presenting to emergency department from hemodialysis  today with complaint of elevated blood pressure.  Patient states he remained hypertensive throughout his whole dialysis session but was able to complete the session.  Patient medicated with multiple doses of hydralazine in ED, current blood pressure 182/87.  BNP 1482, troponin 25.  Labs consistent with ESRD.  Patient found to have a UTI and started on IV ceftriaxone.  Imaging showing bladder wall thickening but negative for acute process per radiology review.  Patient medicated for pain and admitted for further medical management.  Hypertensive urgency  Admit to telemetry observation per Dr. Gomez  See imaging results above  Hydralazine IV x2 in ED  Metoprolol IV x1 as needed  Telemetry monitoring  Resume home medications when med rec is complete  Morphine as needed/Zofran as needed  Trend troponin     2.  ESRD/HTN/hyponatremia/hyper kalemia/diabetes/testicular cancer     Renal diet  Continue home medications when med rec is complete     3.  DVT Ppx  SCDs  No chemical prophylaxis 2/2 recurrent hemoptysis  Activity as tolerated     I spent 20 minutes in the professional and overall care of this patient.        Brie Hameed, APRN-CNP  Patient fully evaluated and plan as above.               Revision History    Patient fully evaluated on October 15.  Continue to monitor blood pressure.  Correct electrolytes.  Appreciate urologic and nephrology consultations.  Patient fully evaluated on October 16.  Patient with questionable seizure activity and EEG is pending.  Patient started on Keppra.  Neurologic examination without focus today.  Dialyze patient and recheck labs in AM.           Enrique Gomez MD                         Pertinent Physical Exam At Time of Discharge  Physical Exam    Outpatient Follow-Up  No future appointments.    Patient fully evaluated on October 17.  No further seizure activity.  Patient cleared for discharge to ECF when certified.    Patient fully evaluated on October 18.  No further  neurologic changes noted.  Work-up was negative to date.  Patient discharged back to an ECF.    Patient fully evaluated on October 19 and awaiting discharge to ECF with dialysis.  Enrique Gomez MD

## 2023-10-20 ENCOUNTER — HOSPITAL ENCOUNTER (OUTPATIENT)
Dept: CARDIOLOGY | Facility: HOSPITAL | Age: 58
Discharge: HOME | End: 2023-10-20
Payer: MEDICAID

## 2023-10-20 ENCOUNTER — APPOINTMENT (OUTPATIENT)
Dept: DIALYSIS | Facility: HOSPITAL | Age: 58
End: 2023-10-20
Payer: MEDICAID

## 2023-10-20 VITALS
SYSTOLIC BLOOD PRESSURE: 126 MMHG | WEIGHT: 128 LBS | DIASTOLIC BLOOD PRESSURE: 58 MMHG | TEMPERATURE: 98.8 F | BODY MASS INDEX: 20.09 KG/M2 | RESPIRATION RATE: 16 BRPM | HEART RATE: 90 BPM | OXYGEN SATURATION: 94 % | HEIGHT: 67 IN

## 2023-10-20 LAB
ATRIAL RATE: 84 BPM
ERYTHROCYTE [DISTWIDTH] IN BLOOD BY AUTOMATED COUNT: 18.4 % (ref 11.5–14.5)
HCT VFR BLD AUTO: 30.7 % (ref 41–52)
HGB BLD-MCNC: 9.8 G/DL (ref 13.5–17.5)
MCH RBC QN AUTO: 29.6 PG (ref 26–34)
MCHC RBC AUTO-ENTMCNC: 31.9 G/DL (ref 32–36)
MCV RBC AUTO: 93 FL (ref 80–100)
NRBC BLD-RTO: 0 /100 WBCS (ref 0–0)
P AXIS: 64 DEGREES
P OFFSET: 183 MS
P ONSET: 127 MS
PLATELET # BLD AUTO: 100 X10*3/UL (ref 150–450)
PMV BLD AUTO: 11 FL (ref 7.5–11.5)
PR INTERVAL: 162 MS
Q ONSET: 208 MS
QRS COUNT: 14 BEATS
QRS DURATION: 126 MS
QT INTERVAL: 420 MS
QTC CALCULATION(BAZETT): 496 MS
QTC FREDERICIA: 469 MS
R AXIS: 252 DEGREES
RBC # BLD AUTO: 3.31 X10*6/UL (ref 4.5–5.9)
T AXIS: 72 DEGREES
T OFFSET: 418 MS
VENTRICULAR RATE: 84 BPM
WBC # BLD AUTO: 6.7 X10*3/UL (ref 4.4–11.3)

## 2023-10-20 PROCEDURE — 36415 COLL VENOUS BLD VENIPUNCTURE: CPT | Performed by: NURSE PRACTITIONER

## 2023-10-20 PROCEDURE — 93005 ELECTROCARDIOGRAM TRACING: CPT | Mod: 59

## 2023-10-20 PROCEDURE — 90937 HEMODIALYSIS REPEATED EVAL: CPT

## 2023-10-20 PROCEDURE — 85027 COMPLETE CBC AUTOMATED: CPT | Performed by: NURSE PRACTITIONER

## 2023-10-20 PROCEDURE — G0378 HOSPITAL OBSERVATION PER HR: HCPCS

## 2023-10-20 PROCEDURE — 2500000004 HC RX 250 GENERAL PHARMACY W/ HCPCS (ALT 636 FOR OP/ED): Performed by: INTERNAL MEDICINE

## 2023-10-20 PROCEDURE — 93010 ELECTROCARDIOGRAM REPORT: CPT | Performed by: STUDENT IN AN ORGANIZED HEALTH CARE EDUCATION/TRAINING PROGRAM

## 2023-10-20 PROCEDURE — 8010000001 HC DIALYSIS - HEMODIALYSIS PER DAY

## 2023-10-20 PROCEDURE — 2500000001 HC RX 250 WO HCPCS SELF ADMINISTERED DRUGS (ALT 637 FOR MEDICARE OP): Performed by: INTERNAL MEDICINE

## 2023-10-20 RX ADMIN — HYDROXYZINE HYDROCHLORIDE 25 MG: 25 TABLET ORAL at 13:42

## 2023-10-20 RX ADMIN — TRAMADOL HYDROCHLORIDE 50 MG: 50 TABLET, COATED ORAL at 13:42

## 2023-10-20 RX ADMIN — HEPARIN SODIUM 2000 UNITS: 1000 INJECTION INTRAVENOUS; SUBCUTANEOUS at 09:37

## 2023-10-20 RX ADMIN — TRAMADOL HYDROCHLORIDE 50 MG: 50 TABLET, COATED ORAL at 03:50

## 2023-10-20 ASSESSMENT — COGNITIVE AND FUNCTIONAL STATUS - GENERAL
PERSONAL GROOMING: A LITTLE
WALKING IN HOSPITAL ROOM: A LITTLE
MOBILITY SCORE: 20
CLIMB 3 TO 5 STEPS WITH RAILING: A LITTLE
MOVING TO AND FROM BED TO CHAIR: A LITTLE
DRESSING REGULAR LOWER BODY CLOTHING: A LITTLE
STANDING UP FROM CHAIR USING ARMS: A LITTLE
HELP NEEDED FOR BATHING: A LITTLE
DAILY ACTIVITIY SCORE: 21

## 2023-10-20 ASSESSMENT — PAIN - FUNCTIONAL ASSESSMENT
PAIN_FUNCTIONAL_ASSESSMENT: 0-10
PAIN_FUNCTIONAL_ASSESSMENT: 0-10

## 2023-10-20 ASSESSMENT — PAIN SCALES - GENERAL
PAINLEVEL_OUTOF10: 7
PAINLEVEL_OUTOF10: 7

## 2023-10-20 NOTE — CARE PLAN
The patient's goals for the shift include To maintain pts pain    Problem: Fall/Injury  Goal: Not fall by end of shift  Outcome: Progressing

## 2023-10-20 NOTE — PROGRESS NOTES
10/20/23 1003   Discharge Planning   Type of Post Acute Facility Services Skilled nursing   Patient expects to be discharged to: [facility]   Does the patient need discharge transport arranged? Yes   RoundTrip coordination needed? Yes     1004:  Insurance auth obtained for patient to admit to [facility].      1335:  Patient will DC today at 1:30 pm to [facility].

## 2023-10-20 NOTE — NURSING NOTE
Report from Sending RN:    Report From: Jaz  Recent Surgery of Procedure: No  Baseline Level of Consciousness (LOC): a/o x3  Oxygen Use: No  Type: N/A  Diabetic: No  Last BP Med Given Day of Dialysis: None  Last Pain Med Given: None  Lab Tests to be Obtained with Dialysis: No  Blood Transfusion to be Given During Dialysis: No  Available IV Access: No  Medications to be Administered During Dialysis: No  Continuous IV Infusion Running: No  Restraints on Currently or in the Last 24 Hours: No  Hand-Off Communication: Full code; stable for hd

## 2023-10-20 NOTE — NURSING NOTE
Report to Receiving RN:    Report From:   Time Report Called: [id number]  Hand-Off Communication: Yes  Complications During Treatment: No  Ultrafiltration Treatment: No  Medications Administered During Dialysis: No  Blood Products Administered During Dialysis: No  Labs Sent During Dialysis: No  Heparin Drip Rate Changes: No    Electronic Signatures:  Heather Jack (Signed )   Authored: Heather Jack RN   (Signed )   Authored: Heather Jack    Last Updated: 2:04 PM by HEATHER JACK

## 2023-10-20 NOTE — DISCHARGE SUMMARY
Discharge Diagnosis  Hypertensive urgency    Issues Requiring Follow-Up  Patient fully evaluated on October 17 and blood pressure under better control.    Discharge Meds     Your medication list        START taking these medications        Instructions Last Dose Given Next Dose Due   carvedilol 25 mg tablet  Commonly known as: Coreg      Take 1 tablet (25 mg) by mouth 2 times a day with meals.       hydrOXYzine HCL 25 mg tablet  Commonly known as: Atarax      Take 1 tablet (25 mg) by mouth every 6 hours if needed for anxiety for up to 10 days.       ipratropium-albuteroL 0.5-2.5 mg/3 mL nebulizer solution  Commonly known as: Duo-Neb      Take 3 mL by nebulization every 2 hours if needed for wheezing.       levETIRAcetam 500 mg tablet  Commonly known as: Keppra      Take 1 tablet (500 mg) by mouth 2 times a day.              CHANGE how you take these medications        Instructions Last Dose Given Next Dose Due   hydrALAZINE 100 mg tablet  Commonly known as: Apresoline  What changed:   medication strength  how much to take  when to take this      Take 1 tablet (100 mg) by mouth 2 times a day.       minoxidil 10 mg tablet  Commonly known as: Loniten  What changed:   medication strength  how much to take  when to take this      Take 1 tablet (10 mg) by mouth 2 times a day.              CONTINUE taking these medications        Instructions Last Dose Given Next Dose Due   amLODIPine 10 mg tablet  Commonly known as: Norvasc           atorvastatin 40 mg tablet  Commonly known as: Lipitor           calcium acetate 667 mg capsule  Commonly known as: Phoslo           clopidogrel 75 mg tablet  Commonly known as: Plavix           gabapentin 100 mg capsule  Commonly known as: Neurontin           guaiFENesin 600 mg 12 hr tablet  Commonly known as: Mucinex           mirtazapine 7.5 mg tablet  Commonly known as: Remeron                     Where to Get Your Medications        These medications were sent to [id number] -  [address]  [address] [facility] [address]      Phone: [phone]   carvedilol 25 mg tablet  hydrALAZINE 100 mg tablet  hydrOXYzine HCL 25 mg tablet  ipratropium-albuteroL 0.5-2.5 mg/3 mL nebulizer solution  levETIRAcetam 500 mg tablet  minoxidil 10 mg tablet         Test Results Pending At Discharge  Pending Labs       Order Current Status    Extra Tubes Preliminary result    Extra Tubes Preliminary result    Extra Tubes Preliminary result    Green Top Preliminary result    Lavender Top Preliminary result    Light Blue Top Preliminary result    SST TOP Preliminary result    SST TOP Preliminary result    SST TOP Preliminary result            Hospital Course           Enrique Gomez MD  Physician  Internal Medicine     Progress Notes      Signed     Date of Service: 10/16/2023  4:19 PM     Signed       Expand All Collapse All    Ponce Do is a 58 y.o. male on day 0 of admission presenting with Hypertensive urgency.           Subjective   Patient with fall earlier.  Clinically unchanged.              Objective   Last Recorded Vitals  /88   Pulse 85   Temp 37.1 °C (98.8 °F) (Temporal)   Resp 18   Wt 58.1 kg (128 lb)   SpO2 96%   Intake/Output last 3 Shifts:     Intake/Output Summary (Last 24 hours) at 10/16/2023 1619  Last data filed at 10/16/2023 1205      Gross per 24 hour   Intake 600 ml   Output 100 ml   Net 500 ml         Admission Weight  Weight: 58.1 kg (128 lb) (10/13/23 1736)     Daily Weight  10/13/23 : 58.1 kg (128 lb)     Image Results  CT angio chest for pulmonary embolism  Narrative: Interpreted By:  Timothy Beckford,   STUDY:  CT ANGIO CHEST FOR PULMONARY EMBOLISM;  10/16/2023 2:02 pm      INDICATION:  Signs/Symptoms:r/o pe.      COMPARISON:  CT chest, abdomen and pelvis 08/01/2023      ACCESSION NUMBER(S):  [id number]      ORDERING CLINICIAN:  LUIS M HELLER      TECHNIQUE:  Helical data acquisition of the chest was obtained  after  administration of 75ml IV Omnipaque 350. Images were reformatted in  coronal and sagittal planes. Axial and coronal MIP images were  created and reviewed.      FINDINGS:  POTENTIAL LIMITATIONS OF THE STUDY: None      HEART AND VESSELS:  No discrete filling defects within the main pulmonary artery or its  branches.      Main pulmonary artery and its branches are normal in caliber.      The thoracic aorta is of normal course and caliber without vascular  calcifications.      Severe coronary artery calcifications are seen.The study is not  optimized for evaluation of coronary arteries.      Mild-to-moderate cardiomegaly biatrial enlargement.      Trace pericardial effusion.      MEDIASTINUM AND ELIZABETH, LOWER NECK AND AXILLA:  The visualized thyroid gland is within normal limits.      [place] left-greater-than-right soft tissue hilar  thickening/adenopathy, similar to the chest CT from 08/01/2023. No  mediastinal, axillary supraclavicular nodes.      Small hiatal hernia.      LUNGS AND AIRWAYS:  Central airways are patent without endobronchial lesions. Small  amount of retained secretions.Mild upper lobe predominant  centrilobular and paraseptal emphysema. No focal consolidation. No  pneumothorax. No pleural effusion. Mild bibasilar atelectasis. No  suspicious pulmonary nodule.      UPPER ABDOMEN:  The visualized subdiaphragmatic structures demonstrate no remarkable  findings.      CHEST WALL AND OSSEOUS STRUCTURES:  Right-sided double-lumen catheter with the tip terminating in the  right atrium. There are no suspicious osseous lesions. Multilevel  degenerative changes are present      Impression: 1.  No evidence of pulmonary embolism.  2. Mild upper lobe predominant centrilobular and paraseptal emphysema.  3. [place] left-greater-than-right hilar soft tissue  thickening/adenopathy, similar to the CT chest from 08/01/2023,  likely reactive. Recommend short-term follow-up to document  resolution..           Signed by: Timothy Beckford 10/16/2023 3:24 PM  Dictation workstation:   [id number]        Physical Exam     Relevant Results                       Assessment/Plan   This patient currently has cardiac telemetry ordered; if you would like to modify or discontinue the telemetry order, click hereto go to the orders activity to modify/discontinue the order.                 Principal Problem:    Hypertensive urgency              Enrique Gomez MD  Physician  Internal Medicine     H&P      Addendum     Date of Service: 10/14/2023  5:59 AM      Addendum        Expand All Collapse All    History Of Present Illness  Ponce Do is a 58 y.o. male presenting to emergency department from dialysis for evaluation of hypertension.  Patient was reportedly at dialysis today and remained hypertensive throughout his session.  Patient states he was able to complete his full dialysis session.  Patient was sent into emergency department for evaluation of hypertension.  Patient does report an increase in leg pain.  Patient denies chest pain or dizziness.  Patient denies recent illness, nausea, vomiting, diarrhea.     In ED, chest x-ray completed and negative for acute process per radiology review.  Bilateral lower extremity ultrasound completed and negative for acute process.  CT of the abdomen pelvis completed showing bladder wall thickening and testicular abnormalities concerning for malignancy consistent with patient's history of testicular cancer.  Hemoglobin 12.2, hematocrit 37.2, BNP 1482, troponin 25, glucose 126, sodium 135, GFR 14, creatinine 4.66.  Blood cultures and urine culture pending.  UTI positive.  Patient started on IV ceftriaxone.  Patient medicated with multiple doses of hydralazine.  Blood pressure 182/87, heart rate 82, respirations 18, temperature 36.8 °C, SPO2 98% on room air.  Patient admitted to telemetry observation under the care of Dr. Gomez who will continue to follow.  I was asked to do H&P and  "place initial admission orders.     Past Medical History  ESRD with hemodialysis Osnlgv-Kmjnxuyji-Nbpfrf, testicular cancer, cerebral infarct with hemiplegia (right-sided), hyperkalemia, hypertension, hyponatremia, diabetes, recent pneumonia with recurrent hemoptysis     Surgical History  Bronchoscopy     Social History  He has no history on file for tobacco use, alcohol use, and drug use.     Family History  Reviewed and noncontributory     Allergies  NKDA/NKA     Review of Systems  A 10 point review of systems was completed and negative except what is listed in HPI  Physical Exam  Constitutional:       Appearance: Normal appearance.   HENT:      Mouth/Throat:      Mouth: Mucous membranes are moist.      Pharynx: Oropharynx is clear.   Eyes:      Pupils: Pupils are equal, round, and reactive to light.   Cardiovascular:      Rate and Rhythm: Normal rate and regular rhythm.   Pulmonary:      Effort: Pulmonary effort is normal.      Breath sounds: Normal breath sounds.   Abdominal:      General: Bowel sounds are normal.      Palpations: Abdomen is soft.   Musculoskeletal:         General: Normal range of motion.      Cervical back: Normal range of motion.   Skin:     General: Skin is warm and dry.      Capillary Refill: Capillary refill takes less than 2 seconds.   Neurological:      General: No focal deficit present.      Mental Status: He is alert and oriented to person, place, and time. Mental status is at baseline.   Psychiatric:         Mood and Affect: Mood normal.         Behavior: Behavior normal.            Last Recorded Vitals  Blood pressure (!) 182/87, pulse 82, temperature 37.1 °C (98.8 °F), resp. rate 18, height 1.702 m (5' 7\"), weight 58.1 kg (128 lb), SpO2 98 %.     Relevant Results  Vascular US lower extremity venous duplex bilateral     Result Date: 10/14/2023  Interpreted By:  Ponce Nunes, STUDY: [facility] LOWER EXTREMITY VENOUS DUPLEX BILATERAL; 10/14/2023 1:25 am   INDICATION: " Signs/Symptoms:pain.   COMPARISON: None.   ACCESSION NUMBER(S): [id number]   ORDERING CLINICIAN: MARC CROFT   TECHNIQUE: 2D grayscale and color-flow duplex images were obtained along with Doppler spectral waveform analysis of the deep venous system of the lower extremity from the groin to the knee. Attempts were made to image the calf veins. Venous compression and augmentation were performed.   FINDINGS: Right Lower Extremity: There is normal compressibility and flow within the visualized vessels of the deep venous system of this lower extremity.   Left Lower Extremity: There is normal compressibility and flow within the visualized vessels of the deep venous system of this lower extremity.          No evidence for deep venous thrombosis within the limits of this examination.   MACRO: None.   Signed by: Ponce Nunes 10/14/2023 1:43 AM Dictation workstation:   [id number]     CT pelvis wo IV contrast     Result Date: 10/14/2023  Interpreted By:  Kavon Piedra, STUDY: CT PELVIS WO IV CONTRAST;  10/13/2023 11:50 pm   INDICATION: Signs/Symptoms:left groin pain. hx of testicular cancer.   COMPARISON: 8/1/2023   ACCESSION NUMBER(S): [id number]   ORDERING CLINICIAN: MARC CROFT   TECHNIQUE: Contiguous axial images of the pelvis were obtained without intravenous contrast. Coronal and sagittal reformatted images were obtained from the axial images.   FINDINGS: Limited evaluation the soft tissues secondary to lack of intravenous. Gallstones noted in the gallbladder. No evidence of bowel obstruction.   Urinary bladder is underdistended and not well evaluated. There is however prominent urinary bladder wall thickening and edema.   Limited evaluation of the scrotum.   No evidence acute displaced pelvic fracture. Vascular calcifications.        Urinary bladder wall thickening and edema compatible with cystitis; please clinically correlate with urinalysis.   No evidence of inguinal hernia.   Limited evaluation the scrotum and  if there is concern for scrotal or testicular pathology, scrotal ultrasound is recommended for further evaluation.   MACRO: None   Signed by: Kavon Piedra 10/14/2023 12:41 AM Dictation workstation:   [id number]     XR chest 2 views     Result Date: 10/13/2023  Interpreted By:  Flynn Messer, STUDY: XR CHEST 2 VIEWS;  10/13/2023 5:58 pm   INDICATION: Signs/Symptoms:CP.   COMPARISON: Chest radiograph 08/13/2023   ACCESSION NUMBER(S): [id number]   ORDERING CLINICIAN: ROBIN MCKINLEY   FINDINGS: SUPPORT DEVICES: Right IJ central venous catheter terminates at the cavoatrial junction. Metallic device overlies the right heart, unchanged from prior exams, possibly reflecting a interatrial septal occlusion device.   CARDIOMEDIASTINAL SILHOUETTE: Cardiomediastinal silhouette is normal in size and configuration.   LUNGS: No pulmonary consolidation, pleural effusion or pneumothorax.   ABDOMEN: No remarkable upper abdominal findings.   BONES: No acute osseous abnormality.        1. No acute cardiopulmonary process.     Signed by: Flynn Messer 10/13/2023 6:33 PM Dictation workstation:   [id number]                 Results for orders placed or performed during the hospital encounter of 10/13/23 (from the past 24 hour(s))   Troponin I, High Sensitivity   Result Value Ref Range     Troponin I, High Sensitivity 25 (H) 0 - 20 ng/L   Magnesium   Result Value Ref Range     Magnesium 2.04 1.60 - 2.40 mg/dL   Comprehensive Metabolic Panel   Result Value Ref Range     Glucose 126 (H) 74 - 99 mg/dL     Sodium 135 (L) 136 - 145 mmol/L     Potassium 4.7 3.5 - 5.3 mmol/L     Chloride 99 98 - 107 mmol/L     Bicarbonate 25 21 - 32 mmol/L     Anion Gap 16 10 - 20 mmol/L     Urea Nitrogen 19 6 - 23 mg/dL     Creatinine 4.66 (H) 0.50 - 1.30 mg/dL     eGFR 14 (L) >60 mL/min/1.73m*2     Calcium 8.5 (L) 8.6 - 10.3 mg/dL     Albumin 4.0 3.4 - 5.0 g/dL     Alkaline Phosphatase 54 33 - 120 U/L     Total Protein 7.8 6.4 - 8.2 g/dL     AST 25 9 - 39  U/L     Bilirubin, Total 0.4 0.0 - 1.2 mg/dL     ALT 16 10 - 52 U/L   CBC and Auto Differential   Result Value Ref Range     WBC 8.6 4.4 - 11.3 x10*3/uL     nRBC 0.0 0.0 - 0.0 /100 WBCs     RBC 4.10 (L) 4.50 - 5.90 x10*6/uL     Hemoglobin 12.2 (L) 13.5 - 17.5 g/dL     Hematocrit 37.2 (L) 41.0 - 52.0 %     MCV 91 80 - 100 fL     MCH 29.8 26.0 - 34.0 pg     MCHC 32.8 32.0 - 36.0 g/dL     RDW 18.6 (H) 11.5 - 14.5 %     Platelets 102 (L) 150 - 450 x10*3/uL     MPV 10.8 7.5 - 11.5 fL     Neutrophils % 74.6 40.0 - 80.0 %     Immature Granulocytes %, Automated 0.7 0.0 - 0.9 %     Lymphocytes % 16.6 13.0 - 44.0 %     Monocytes % 4.7 2.0 - 10.0 %     Eosinophils % 2.9 0.0 - 6.0 %     Basophils % 0.5 0.0 - 2.0 %     Neutrophils Absolute 6.41 1.20 - 7.70 x10*3/uL     Immature Granulocytes Absolute, Automated 0.06 0.00 - 0.70 x10*3/uL     Lymphocytes Absolute 1.42 1.20 - 4.80 x10*3/uL     Monocytes Absolute 0.40 0.10 - 1.00 x10*3/uL     Eosinophils Absolute 0.25 0.00 - 0.70 x10*3/uL     Basophils Absolute 0.04 0.00 - 0.10 x10*3/uL   B-type natriuretic peptide   Result Value Ref Range     BNP 1,482 (H) 0 - 99 pg/mL   Urinalysis with Reflex Microscopic and Culture   Result Value Ref Range     Color, Urine Straw Straw, Yellow     Appearance, Urine Clear Clear     Specific Gravity, Urine 1.008 1.005 - 1.035     pH, Urine 9.0 (N) 5.0, 5.5, 6.0, 6.5, 7.0, 7.5, 8.0     Protein, Urine >=500 (3+) (N) NEGATIVE mg/dL     Glucose, Urine 150 (2+) (A) NEGATIVE mg/dL     Blood, Urine NEGATIVE NEGATIVE     Ketones, Urine NEGATIVE NEGATIVE mg/dL     Bilirubin, Urine NEGATIVE NEGATIVE     Urobilinogen, Urine <2.0 <2.0 mg/dL     Nitrite, Urine NEGATIVE NEGATIVE     Leukocyte Esterase, Urine TRACE (A) NEGATIVE   Extra Urine Gray Tube   Result Value Ref Range     Extra Tube Hold for add-ons.     Urinalysis Microscopic Only   Result Value Ref Range     WBC, Urine 11-20 (A) 1-5, NONE /HPF     RBC, Urine 3-5 NONE, 1-2, 3-5 /HPF             Assessment/Plan   Ponce is a 58-year-old male patient presenting to emergency department from hemodialysis today with complaint of elevated blood pressure.  Patient states he remained hypertensive throughout his whole dialysis session but was able to complete the session.  Patient medicated with multiple doses of hydralazine in ED, current blood pressure 182/87.  BNP 1482, troponin 25.  Labs consistent with ESRD.  Patient found to have a UTI and started on IV ceftriaxone.  Imaging showing bladder wall thickening but negative for acute process per radiology review.  Patient medicated for pain and admitted for further medical management.  Hypertensive urgency  Admit to telemetry observation per Dr. Gomez  See imaging results above  Hydralazine IV x2 in ED  Metoprolol IV x1 as needed  Telemetry monitoring  Resume home medications when med rec is complete  Morphine as needed/Zofran as needed  Trend troponin     2.  ESRD/HTN/hyponatremia/hyper kalemia/diabetes/testicular cancer     Renal diet  Continue home medications when med rec is complete     3.  DVT Ppx  SCDs  No chemical prophylaxis 2/2 recurrent hemoptysis  Activity as tolerated     I spent 20 minutes in the professional and overall care of this patient.        Brie Hameed, SHINE-CNP  Patient fully evaluated and plan as above.               Revision History    Patient fully evaluated on October 15.  Continue to monitor blood pressure.  Correct electrolytes.  Appreciate urologic and nephrology consultations.  Patient fully evaluated on October 16.  Patient with questionable seizure activity and EEG is pending.  Patient started on Keppra.  Neurologic examination without focus today.  Dialyze patient and recheck labs in AM.           Enrique Gomez MD                         Pertinent Physical Exam At Time of Discharge  Physical Exam    Outpatient Follow-Up  No future appointments.    Patient fully evaluated on October 17.  No further seizure activity.   Patient cleared for discharge to ECF when certified.    Patient fully evaluated on October 18.  No further neurologic changes noted.  Work-up was negative to date.  Patient discharged back to an ECF.    Patient fully evaluated on October 19 and awaiting discharge to ECF with dialysis.    Patient fully evaluated on October 28.  Blood pressure stable.  Patient cleared for discharge to ECF today.  Enrique Gomez MD

## 2023-11-18 ENCOUNTER — HOSPITAL ENCOUNTER (EMERGENCY)
Facility: HOSPITAL | Age: 58
Discharge: HOME | End: 2023-11-19
Attending: INTERNAL MEDICINE
Payer: MEDICAID

## 2023-11-18 ENCOUNTER — APPOINTMENT (OUTPATIENT)
Dept: RADIOLOGY | Facility: HOSPITAL | Age: 58
End: 2023-11-18
Payer: MEDICAID

## 2023-11-18 DIAGNOSIS — M54.50 ACUTE RIGHT-SIDED LOW BACK PAIN WITHOUT SCIATICA: Primary | ICD-10-CM

## 2023-11-18 LAB
ALBUMIN SERPL BCP-MCNC: 3.8 G/DL (ref 3.4–5)
ALP SERPL-CCNC: 73 U/L (ref 33–120)
ALT SERPL W P-5'-P-CCNC: 6 U/L (ref 10–52)
ANION GAP SERPL CALC-SCNC: 13 MMOL/L (ref 10–20)
AST SERPL W P-5'-P-CCNC: 16 U/L (ref 9–39)
BASOPHILS # BLD AUTO: 0.03 X10*3/UL (ref 0–0.1)
BASOPHILS NFR BLD AUTO: 0.4 %
BILIRUB SERPL-MCNC: 0.8 MG/DL (ref 0–1.2)
BUN SERPL-MCNC: 17 MG/DL (ref 6–23)
CALCIUM SERPL-MCNC: 9.1 MG/DL (ref 8.6–10.3)
CARDIAC TROPONIN I PNL SERPL HS: 34 NG/L (ref 0–20)
CARDIAC TROPONIN I PNL SERPL HS: 34 NG/L (ref 0–20)
CHLORIDE SERPL-SCNC: 100 MMOL/L (ref 98–107)
CO2 SERPL-SCNC: 29 MMOL/L (ref 21–32)
CREAT SERPL-MCNC: 4.22 MG/DL (ref 0.5–1.3)
EOSINOPHIL # BLD AUTO: 0.11 X10*3/UL (ref 0–0.7)
EOSINOPHIL NFR BLD AUTO: 1.3 %
ERYTHROCYTE [DISTWIDTH] IN BLOOD BY AUTOMATED COUNT: 17 % (ref 11.5–14.5)
GFR SERPL CREATININE-BSD FRML MDRD: 16 ML/MIN/1.73M*2
GLUCOSE SERPL-MCNC: 104 MG/DL (ref 74–99)
HCT VFR BLD AUTO: 36 % (ref 41–52)
HGB BLD-MCNC: 12.2 G/DL (ref 13.5–17.5)
IMM GRANULOCYTES # BLD AUTO: 0.06 X10*3/UL (ref 0–0.7)
IMM GRANULOCYTES NFR BLD AUTO: 0.7 % (ref 0–0.9)
LIPASE SERPL-CCNC: 41 U/L (ref 9–82)
LYMPHOCYTES # BLD AUTO: 0.96 X10*3/UL (ref 1.2–4.8)
LYMPHOCYTES NFR BLD AUTO: 11.6 %
MAGNESIUM SERPL-MCNC: 2.02 MG/DL (ref 1.6–2.4)
MCH RBC QN AUTO: 30.5 PG (ref 26–34)
MCHC RBC AUTO-ENTMCNC: 33.9 G/DL (ref 32–36)
MCV RBC AUTO: 90 FL (ref 80–100)
MONOCYTES # BLD AUTO: 0.39 X10*3/UL (ref 0.1–1)
MONOCYTES NFR BLD AUTO: 4.7 %
NEUTROPHILS # BLD AUTO: 6.74 X10*3/UL (ref 1.2–7.7)
NEUTROPHILS NFR BLD AUTO: 81.3 %
NRBC BLD-RTO: 0 /100 WBCS (ref 0–0)
PLATELET # BLD AUTO: 86 X10*3/UL (ref 150–450)
POLYCHROMASIA BLD QL SMEAR: NORMAL
POTASSIUM SERPL-SCNC: 3.7 MMOL/L (ref 3.5–5.3)
PROT SERPL-MCNC: 7.5 G/DL (ref 6.4–8.2)
RBC # BLD AUTO: 4 X10*6/UL (ref 4.5–5.9)
RBC MORPH BLD: NORMAL
SODIUM SERPL-SCNC: 138 MMOL/L (ref 136–145)
TARGETS BLD QL SMEAR: NORMAL
WBC # BLD AUTO: 8.3 X10*3/UL (ref 4.4–11.3)

## 2023-11-18 PROCEDURE — 74176 CT ABD & PELVIS W/O CONTRAST: CPT | Mod: FOREIGN READ | Performed by: RADIOLOGY

## 2023-11-18 PROCEDURE — 99285 EMERGENCY DEPT VISIT HI MDM: CPT | Mod: 25

## 2023-11-18 PROCEDURE — 84484 ASSAY OF TROPONIN QUANT: CPT

## 2023-11-18 PROCEDURE — 83690 ASSAY OF LIPASE: CPT

## 2023-11-18 PROCEDURE — 2500000001 HC RX 250 WO HCPCS SELF ADMINISTERED DRUGS (ALT 637 FOR MEDICARE OP)

## 2023-11-18 PROCEDURE — 36415 COLL VENOUS BLD VENIPUNCTURE: CPT

## 2023-11-18 PROCEDURE — 85025 COMPLETE CBC W/AUTO DIFF WBC: CPT

## 2023-11-18 PROCEDURE — 72131 CT LUMBAR SPINE W/O DYE: CPT

## 2023-11-18 PROCEDURE — 80053 COMPREHEN METABOLIC PANEL: CPT

## 2023-11-18 PROCEDURE — 71046 X-RAY EXAM CHEST 2 VIEWS: CPT | Performed by: STUDENT IN AN ORGANIZED HEALTH CARE EDUCATION/TRAINING PROGRAM

## 2023-11-18 PROCEDURE — 71046 X-RAY EXAM CHEST 2 VIEWS: CPT | Mod: FY

## 2023-11-18 PROCEDURE — 74176 CT ABD & PELVIS W/O CONTRAST: CPT | Mod: FR

## 2023-11-18 PROCEDURE — 72131 CT LUMBAR SPINE W/O DYE: CPT | Mod: FOREIGN READ | Performed by: RADIOLOGY

## 2023-11-18 PROCEDURE — 83735 ASSAY OF MAGNESIUM: CPT

## 2023-11-18 RX ORDER — OXYCODONE AND ACETAMINOPHEN 5; 325 MG/1; MG/1
1 TABLET ORAL ONCE
Status: COMPLETED | OUTPATIENT
Start: 2023-11-18 | End: 2023-11-18

## 2023-11-18 RX ADMIN — OXYCODONE HYDROCHLORIDE AND ACETAMINOPHEN 1 TABLET: 5; 325 TABLET ORAL at 20:26

## 2023-11-18 ASSESSMENT — LIFESTYLE VARIABLES
EVER FELT BAD OR GUILTY ABOUT YOUR DRINKING: NO
EVER HAD A DRINK FIRST THING IN THE MORNING TO STEADY YOUR NERVES TO GET RID OF A HANGOVER: NO
HAVE PEOPLE ANNOYED YOU BY CRITICIZING YOUR DRINKING: NO
HAVE YOU EVER FELT YOU SHOULD CUT DOWN ON YOUR DRINKING: NO
REASON UNABLE TO ASSESS: NO

## 2023-11-18 ASSESSMENT — PAIN SCALES - GENERAL: PAINLEVEL_OUTOF10: 10 - WORST POSSIBLE PAIN

## 2023-11-18 ASSESSMENT — PAIN - FUNCTIONAL ASSESSMENT: PAIN_FUNCTIONAL_ASSESSMENT: 0-10

## 2023-11-19 VITALS
DIASTOLIC BLOOD PRESSURE: 82 MMHG | HEART RATE: 65 BPM | BODY MASS INDEX: 20.89 KG/M2 | OXYGEN SATURATION: 94 % | RESPIRATION RATE: 18 BRPM | TEMPERATURE: 98.2 F | HEIGHT: 66 IN | WEIGHT: 130 LBS | SYSTOLIC BLOOD PRESSURE: 170 MMHG

## 2023-11-19 PROCEDURE — 2500000001 HC RX 250 WO HCPCS SELF ADMINISTERED DRUGS (ALT 637 FOR MEDICARE OP)

## 2023-11-19 RX ORDER — OXYCODONE AND ACETAMINOPHEN 5; 325 MG/1; MG/1
1 TABLET ORAL ONCE
Status: COMPLETED | OUTPATIENT
Start: 2023-11-19 | End: 2023-11-19

## 2023-11-19 RX ADMIN — OXYCODONE HYDROCHLORIDE AND ACETAMINOPHEN 1 TABLET: 5; 325 TABLET ORAL at 00:22

## 2023-11-19 ASSESSMENT — PAIN SCALES - PAIN ASSESSMENT IN ADVANCED DEMENTIA (PAINAD)
BODYLANGUAGE: RELAXED
TOTALSCORE: 0
CONSOLABILITY: NO NEED TO CONSOLE
FACIALEXPRESSION: SMILING OR INEXPRESSIVE
BREATHING: NORMAL

## 2023-11-19 ASSESSMENT — PAIN SCALES - GENERAL: PAINLEVEL_OUTOF10: 8

## 2023-11-19 NOTE — ED PROVIDER NOTES
HPI   Chief Complaint   Patient presents with    Back Pain       Ponce is a 58-year-old male with with an extensive past medical history of ESRD on dialysis, T2DM, hypertension, history of stroke, seizures, and testicular cancer. He receives dialysis three times a week with treatments on Saturday, Tuesday and, Thursday.  His last treatment was today, 11/18/23.  Ponce states that he does produce some urine. He presents with lower right back pain x one week. He denies any red flag symptoms of saddle anesthesia, urinary or bowel incontinence, IV drug use, or fever. The pain does not radiate. He has not taken anything for the pain today.  He currently rates the pain a 7 out of 10.  Admits to similar episodes in the past that were well controlled with Percocet. Patient also admits to some right upper quadrant abdominal pain.  Denies alcohol use, nausea, vomiting, diarrhea, and urinary symptoms.  His last bowel movement was this morning and normal. In addition to his low back and abdominal pain, he is feeling more short of breath today than usual. Ponce does not wear oxygen at home.  Denies chest pain, recent travel, and tobacco use. He does use marijuana occasionally with last usage a couple months ago.                          Krystyna Coma Scale Score: 15                  Patient History   Past Medical History:   Diagnosis Date    Cancer (CMS/HCC)      Past Surgical History:   Procedure Laterality Date    MR HEAD ANGIO WO IV CONTRAST  6/16/2021    MR HEAD ANGIO WO IV CONTRAST 6/16/2021 [facility]    MR NECK ANGIO WO IV CONTRAST  6/16/2021    MR NECK ANGIO WO IV CONTRAST 6/16/2021 [facility]     No family history on file.  Social History     Tobacco Use    Smoking status: Every Day     Packs/day: 1.00     Years: 15.00     Additional pack years: 0.00     Total pack years: 15.00     Types: Cigarettes    Smokeless tobacco: Never   Vaping Use    Vaping Use: Never used   Substance Use Topics    Alcohol use:  Not Currently    Drug use: Never       Physical Exam   ED Triage Vitals [11/18/23 1600]   Temp Heart Rate Resp BP   36.8 °C (98.2 °F) 82 16 (!) 221/106      SpO2 Temp src Heart Rate Source Patient Position   94 % -- -- --      BP Location FiO2 (%)     -- --       Physical Exam  Constitutional:       Appearance: Normal appearance.   Cardiovascular:      Rate and Rhythm: Normal rate and regular rhythm.      Heart sounds: Normal heart sounds.   Pulmonary:      Effort: Pulmonary effort is normal. No respiratory distress.      Breath sounds: Rhonchi present.   Abdominal:      General: Abdomen is flat. There is no distension.      Palpations: Abdomen is soft. There is no mass.      Tenderness: There is abdominal tenderness. There is right CVA tenderness. There is no left CVA tenderness, guarding or rebound.   Musculoskeletal:         General: Normal range of motion.      Comments: Right-sided lumbar paravertebral tenderness   Skin:     General: Skin is warm and dry.   Neurological:      Mental Status: He is alert.      Comments: Patient is at his baseline and alert and oriented x4.   Psychiatric:         Mood and Affect: Mood normal.         Behavior: Behavior normal.         ED Course & MDM   ED Course as of 11/19/23 0009   Sat Nov 18, 2023 2056 EGFR(!): 16 [AH]   2213 Troponin I, High Sensitivity(!) []   2315 Troponin I, High Sensitivity(!) []      ED Course User Index  [] Laila López PA-C         Diagnoses as of 11/19/23 0009   Acute right-sided low back pain without sciatica       Medical Decision Making  Ponce is a 58-year-old male with an extensive past medical history of ESRD on dialysis, hypertension, and history of stroke and seizure.  He presents today with paravertebral right lumbar pain, right upper quadrant abdominal pain, and worsening shortness of breath.  Due to patient's extensive medical history and recent admission for hypertensive urgency, a comprehensive work-up was ordered. Differentials  include acute pancreatitis, colitis, bowel obstruction, nephrolithiasis, pneumonia, ACS, musculoskeletal, and urinary tract infection.     CMP was ordered and revealed creatinine of 4.22 and GFR of 16.  I compared these results with previous labs and they are slightly better than his normal.  No other acute abnormalities found on CMP.  CBC revealed anemia.  Hemoglobin was 12.2.  Again, I compared these results with previous labs and they are around his normal.  Magnesium and lipase were within normal limits.  Troponin and EKG were ordered to rule out ACS.  First troponin was elevated at 34. Second troponin ordered and remained at 34.  Patient Troponin is chronically elevated with today's level lower than prior. CT lumbar spine and CT abdomen pelvis without contrast were ordered and revealed several non-specific findings. Mild wall thickening and inflammatory changes in the right colon suggesting colitis clinically correlate with his right sided abdominal pain. Finding of colitis may also be consistent with fluid overload from dialysis. The patient does not have any clinical signs of infection and at this time empiric antibiotic treatment is not warranted. Encouraged patient to maintain adequate hydration. Discussed with patient if he develops more symptoms consistent with colitis, such as fever, excessive diarrhea, and extreme abdominal pain return to ED. CT abd pelvis also revealed small bilateral pleural effusions and left basilar opacity. Pneumonia is low on the differential as the patient does not have a cough or fever. Lab work does not reveal any sign of infection.      We were unable to collect a urine sample on him today. He was given Percocet for his lower back pain at 20:00 and had some improvement. Patient was reassessed prior to discharge and pain returned to a 8/10. Patient was given another dose of Percocet and again pain improved. The back pain is likely musculoskeletal and patient may be discharged.  Recommended using Lidocaine patch over area of pain. He may also use a heating pad. Follow up with PCP. Discussed results and plan with patient. All questions and concerns addressed.       Procedure  Procedures     Laila López PA-C  11/19/23 0025

## 2023-12-13 LAB
HOLD SPECIMEN: NORMAL

## 2023-12-28 ENCOUNTER — LAB REQUISITION (OUTPATIENT)
Dept: LAB | Facility: HOSPITAL | Age: 58
End: 2023-12-28
Payer: MEDICAID

## 2023-12-28 DIAGNOSIS — D64.9 ANEMIA, UNSPECIFIED: ICD-10-CM

## 2023-12-28 LAB
ANION GAP SERPL CALC-SCNC: 11 MMOL/L (ref 10–20)
BASOPHILS # BLD AUTO: 0.03 X10*3/UL (ref 0–0.1)
BASOPHILS NFR BLD AUTO: 0.5 %
BUN SERPL-MCNC: 25 MG/DL (ref 6–23)
CALCIUM SERPL-MCNC: 8.1 MG/DL (ref 8.6–10.3)
CHLORIDE SERPL-SCNC: 104 MMOL/L (ref 98–107)
CO2 SERPL-SCNC: 26 MMOL/L (ref 21–32)
CREAT SERPL-MCNC: 6.53 MG/DL (ref 0.5–1.3)
EOSINOPHIL # BLD AUTO: 0.11 X10*3/UL (ref 0–0.7)
EOSINOPHIL NFR BLD AUTO: 1.7 %
ERYTHROCYTE [DISTWIDTH] IN BLOOD BY AUTOMATED COUNT: 14.6 % (ref 11.5–14.5)
GFR SERPL CREATININE-BSD FRML MDRD: 9 ML/MIN/1.73M*2
GLUCOSE SERPL-MCNC: 85 MG/DL (ref 74–99)
HCT VFR BLD AUTO: 29.9 % (ref 41–52)
HGB BLD-MCNC: 9.8 G/DL (ref 13.5–17.5)
IMM GRANULOCYTES # BLD AUTO: 0.01 X10*3/UL (ref 0–0.7)
IMM GRANULOCYTES NFR BLD AUTO: 0.2 % (ref 0–0.9)
LYMPHOCYTES # BLD AUTO: 0.98 X10*3/UL (ref 1.2–4.8)
LYMPHOCYTES NFR BLD AUTO: 14.8 %
MCH RBC QN AUTO: 31.2 PG (ref 26–34)
MCHC RBC AUTO-ENTMCNC: 32.8 G/DL (ref 32–36)
MCV RBC AUTO: 95 FL (ref 80–100)
MONOCYTES # BLD AUTO: 0.3 X10*3/UL (ref 0.1–1)
MONOCYTES NFR BLD AUTO: 4.5 %
NEUTROPHILS # BLD AUTO: 5.21 X10*3/UL (ref 1.2–7.7)
NEUTROPHILS NFR BLD AUTO: 78.3 %
NRBC BLD-RTO: 0 /100 WBCS (ref 0–0)
PLATELET # BLD AUTO: 60 X10*3/UL (ref 150–450)
POTASSIUM SERPL-SCNC: 5.1 MMOL/L (ref 3.5–5.3)
RBC # BLD AUTO: 3.14 X10*6/UL (ref 4.5–5.9)
SODIUM SERPL-SCNC: 136 MMOL/L (ref 136–145)
WBC # BLD AUTO: 6.6 X10*3/UL (ref 4.4–11.3)

## 2023-12-28 PROCEDURE — 85025 COMPLETE CBC W/AUTO DIFF WBC: CPT

## 2023-12-28 PROCEDURE — 80048 BASIC METABOLIC PNL TOTAL CA: CPT

## 2024-01-22 ENCOUNTER — LAB REQUISITION (OUTPATIENT)
Dept: LAB | Facility: HOSPITAL | Age: 59
End: 2024-01-22
Payer: MEDICAID

## 2024-01-22 DIAGNOSIS — Z79.899 OTHER LONG TERM (CURRENT) DRUG THERAPY: ICD-10-CM

## 2024-01-22 LAB
ANION GAP SERPL CALC-SCNC: 13 MMOL/L (ref 10–20)
BUN SERPL-MCNC: 25 MG/DL (ref 6–23)
CALCIUM SERPL-MCNC: 8.7 MG/DL (ref 8.6–10.3)
CHLORIDE SERPL-SCNC: 100 MMOL/L (ref 98–107)
CO2 SERPL-SCNC: 30 MMOL/L (ref 21–32)
CREAT SERPL-MCNC: 4.63 MG/DL (ref 0.5–1.3)
EGFRCR SERPLBLD CKD-EPI 2021: 14 ML/MIN/1.73M*2
GLUCOSE SERPL-MCNC: 130 MG/DL (ref 74–99)
POTASSIUM SERPL-SCNC: 5.4 MMOL/L (ref 3.5–5.3)
SODIUM SERPL-SCNC: 138 MMOL/L (ref 136–145)

## 2024-01-22 PROCEDURE — 80048 BASIC METABOLIC PNL TOTAL CA: CPT

## 2024-03-02 ENCOUNTER — HOSPITAL ENCOUNTER (EMERGENCY)
Facility: HOSPITAL | Age: 59
Discharge: HOME | End: 2024-03-02
Attending: STUDENT IN AN ORGANIZED HEALTH CARE EDUCATION/TRAINING PROGRAM
Payer: MEDICAID

## 2024-03-02 ENCOUNTER — APPOINTMENT (OUTPATIENT)
Dept: CARDIOLOGY | Facility: HOSPITAL | Age: 59
End: 2024-03-02
Payer: MEDICAID

## 2024-03-02 ENCOUNTER — APPOINTMENT (OUTPATIENT)
Dept: RADIOLOGY | Facility: HOSPITAL | Age: 59
End: 2024-03-02
Payer: MEDICAID

## 2024-03-02 VITALS
RESPIRATION RATE: 18 BRPM | BODY MASS INDEX: 20.89 KG/M2 | WEIGHT: 130 LBS | SYSTOLIC BLOOD PRESSURE: 150 MMHG | TEMPERATURE: 99.5 F | OXYGEN SATURATION: 94 % | DIASTOLIC BLOOD PRESSURE: 80 MMHG | HEART RATE: 92 BPM | HEIGHT: 66 IN

## 2024-03-02 DIAGNOSIS — R04.2 HEMOPTYSIS: Primary | ICD-10-CM

## 2024-03-02 LAB
ALBUMIN SERPL BCP-MCNC: 2.9 G/DL (ref 3.4–5)
ALP SERPL-CCNC: 333 U/L (ref 33–120)
ALT SERPL W P-5'-P-CCNC: 119 U/L (ref 10–52)
ANION GAP SERPL CALC-SCNC: 13 MMOL/L (ref 10–20)
APTT PPP: 31 SECONDS (ref 27–38)
AST SERPL W P-5'-P-CCNC: 160 U/L (ref 9–39)
BASOPHILS # BLD AUTO: 0.03 X10*3/UL (ref 0–0.1)
BASOPHILS NFR BLD AUTO: 0.4 %
BILIRUB SERPL-MCNC: 0.5 MG/DL (ref 0–1.2)
BNP SERPL-MCNC: 2659 PG/ML (ref 0–99)
BUN SERPL-MCNC: 19 MG/DL (ref 6–23)
CALCIUM SERPL-MCNC: 8 MG/DL (ref 8.6–10.3)
CARDIAC TROPONIN I PNL SERPL HS: 28 NG/L (ref 0–20)
CHLORIDE SERPL-SCNC: 97 MMOL/L (ref 98–107)
CO2 SERPL-SCNC: 28 MMOL/L (ref 21–32)
CREAT SERPL-MCNC: 4.65 MG/DL (ref 0.5–1.3)
EGFRCR SERPLBLD CKD-EPI 2021: 14 ML/MIN/1.73M*2
EOSINOPHIL # BLD AUTO: 0.17 X10*3/UL (ref 0–0.7)
EOSINOPHIL NFR BLD AUTO: 2 %
ERYTHROCYTE [DISTWIDTH] IN BLOOD BY AUTOMATED COUNT: 13.9 % (ref 11.5–14.5)
GLUCOSE SERPL-MCNC: 157 MG/DL (ref 74–99)
HCT VFR BLD AUTO: 25.8 % (ref 41–52)
HGB BLD-MCNC: 8.2 G/DL (ref 13.5–17.5)
IMM GRANULOCYTES # BLD AUTO: 0.02 X10*3/UL (ref 0–0.7)
IMM GRANULOCYTES NFR BLD AUTO: 0.2 % (ref 0–0.9)
INR PPP: 1.2 (ref 0.9–1.1)
LYMPHOCYTES # BLD AUTO: 0.71 X10*3/UL (ref 1.2–4.8)
LYMPHOCYTES NFR BLD AUTO: 8.5 %
MCH RBC QN AUTO: 30.6 PG (ref 26–34)
MCHC RBC AUTO-ENTMCNC: 31.8 G/DL (ref 32–36)
MCV RBC AUTO: 96 FL (ref 80–100)
MONOCYTES # BLD AUTO: 0.8 X10*3/UL (ref 0.1–1)
MONOCYTES NFR BLD AUTO: 9.6 %
NEUTROPHILS # BLD AUTO: 6.58 X10*3/UL (ref 1.2–7.7)
NEUTROPHILS NFR BLD AUTO: 79.3 %
NRBC BLD-RTO: 0 /100 WBCS (ref 0–0)
PLATELET # BLD AUTO: 190 X10*3/UL (ref 150–450)
POTASSIUM SERPL-SCNC: 4.6 MMOL/L (ref 3.5–5.3)
POTASSIUM SERPL-SCNC: 6.4 MMOL/L (ref 3.5–5.3)
PROT SERPL-MCNC: 7 G/DL (ref 6.4–8.2)
PROTHROMBIN TIME: 14 SECONDS (ref 9.8–12.8)
RBC # BLD AUTO: 2.68 X10*6/UL (ref 4.5–5.9)
SODIUM SERPL-SCNC: 132 MMOL/L (ref 136–145)
WBC # BLD AUTO: 8.3 X10*3/UL (ref 4.4–11.3)

## 2024-03-02 PROCEDURE — 36415 COLL VENOUS BLD VENIPUNCTURE: CPT | Performed by: PHYSICIAN ASSISTANT

## 2024-03-02 PROCEDURE — 85730 THROMBOPLASTIN TIME PARTIAL: CPT | Performed by: PHYSICIAN ASSISTANT

## 2024-03-02 PROCEDURE — 84075 ASSAY ALKALINE PHOSPHATASE: CPT | Performed by: PHYSICIAN ASSISTANT

## 2024-03-02 PROCEDURE — 85025 COMPLETE CBC W/AUTO DIFF WBC: CPT | Performed by: PHYSICIAN ASSISTANT

## 2024-03-02 PROCEDURE — 2500000004 HC RX 250 GENERAL PHARMACY W/ HCPCS (ALT 636 FOR OP/ED): Performed by: PHYSICIAN ASSISTANT

## 2024-03-02 PROCEDURE — 83880 ASSAY OF NATRIURETIC PEPTIDE: CPT | Performed by: PHYSICIAN ASSISTANT

## 2024-03-02 PROCEDURE — 84484 ASSAY OF TROPONIN QUANT: CPT | Performed by: PHYSICIAN ASSISTANT

## 2024-03-02 PROCEDURE — 85610 PROTHROMBIN TIME: CPT | Performed by: PHYSICIAN ASSISTANT

## 2024-03-02 PROCEDURE — 71250 CT THORAX DX C-: CPT | Performed by: RADIOLOGY

## 2024-03-02 PROCEDURE — 71250 CT THORAX DX C-: CPT

## 2024-03-02 PROCEDURE — 93005 ELECTROCARDIOGRAM TRACING: CPT

## 2024-03-02 PROCEDURE — 96374 THER/PROPH/DIAG INJ IV PUSH: CPT

## 2024-03-02 PROCEDURE — 84132 ASSAY OF SERUM POTASSIUM: CPT | Mod: 59 | Performed by: PHYSICIAN ASSISTANT

## 2024-03-02 PROCEDURE — 99285 EMERGENCY DEPT VISIT HI MDM: CPT | Mod: 25

## 2024-03-02 RX ADMIN — HYDROMORPHONE HYDROCHLORIDE 0.5 MG: 1 INJECTION, SOLUTION INTRAMUSCULAR; INTRAVENOUS; SUBCUTANEOUS at 12:23

## 2024-03-02 ASSESSMENT — PAIN DESCRIPTION - LOCATION: LOCATION: BACK

## 2024-03-02 ASSESSMENT — PAIN - FUNCTIONAL ASSESSMENT: PAIN_FUNCTIONAL_ASSESSMENT: 0-10

## 2024-03-02 ASSESSMENT — PAIN SCALES - GENERAL: PAINLEVEL_OUTOF10: 7

## 2024-03-02 ASSESSMENT — COLUMBIA-SUICIDE SEVERITY RATING SCALE - C-SSRS
6. HAVE YOU EVER DONE ANYTHING, STARTED TO DO ANYTHING, OR PREPARED TO DO ANYTHING TO END YOUR LIFE?: NO
2. HAVE YOU ACTUALLY HAD ANY THOUGHTS OF KILLING YOURSELF?: NO
1. IN THE PAST MONTH, HAVE YOU WISHED YOU WERE DEAD OR WISHED YOU COULD GO TO SLEEP AND NOT WAKE UP?: NO
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
## 2024-03-10 LAB
ATRIAL RATE: 77 BPM
P AXIS: 17 DEGREES
PR INTERVAL: 169 MS
Q ONSET: 249 MS
QRS COUNT: 12 BEATS
QRS DURATION: 137 MS
QT INTERVAL: 411 MS
QTC CALCULATION(BAZETT): 463 MS
QTC FREDERICIA: 444 MS
R AXIS: -37 DEGREES
T AXIS: 57 DEGREES
T OFFSET: 455 MS
VENTRICULAR RATE: 76 BPM

## 2024-03-16 ENCOUNTER — HOSPITAL ENCOUNTER (EMERGENCY)
Facility: HOSPITAL | Age: 59
Discharge: HOME | End: 2024-03-16
Payer: MEDICAID

## 2024-03-16 VITALS
OXYGEN SATURATION: 94 % | HEART RATE: 78 BPM | SYSTOLIC BLOOD PRESSURE: 148 MMHG | TEMPERATURE: 98.2 F | HEIGHT: 66 IN | DIASTOLIC BLOOD PRESSURE: 84 MMHG | WEIGHT: 131 LBS | BODY MASS INDEX: 21.05 KG/M2 | RESPIRATION RATE: 20 BRPM

## 2024-03-16 DIAGNOSIS — T20.10XA SUPERFICIAL BURN OF FACE, INITIAL ENCOUNTER: Primary | ICD-10-CM

## 2024-03-16 PROCEDURE — 99283 EMERGENCY DEPT VISIT LOW MDM: CPT

## 2024-03-16 RX ORDER — SILVER SULFADIAZINE 10 G/1000G
1 CREAM TOPICAL 2 TIMES DAILY
Qty: 2 G | Refills: 0 | Status: SHIPPED | OUTPATIENT
Start: 2024-03-16 | End: 2024-03-26

## 2024-03-16 ASSESSMENT — LIFESTYLE VARIABLES
HAVE PEOPLE ANNOYED YOU BY CRITICIZING YOUR DRINKING: NO
EVER FELT BAD OR GUILTY ABOUT YOUR DRINKING: NO
HAVE YOU EVER FELT YOU SHOULD CUT DOWN ON YOUR DRINKING: NO
EVER HAD A DRINK FIRST THING IN THE MORNING TO STEADY YOUR NERVES TO GET RID OF A HANGOVER: NO

## 2024-03-16 ASSESSMENT — PAIN SCALES - GENERAL: PAINLEVEL_OUTOF10: 0 - NO PAIN

## 2024-03-16 ASSESSMENT — PAIN - FUNCTIONAL ASSESSMENT: PAIN_FUNCTIONAL_ASSESSMENT: 0-10
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
